# Patient Record
Sex: MALE | Race: WHITE | NOT HISPANIC OR LATINO | Employment: UNEMPLOYED | URBAN - NONMETROPOLITAN AREA
[De-identification: names, ages, dates, MRNs, and addresses within clinical notes are randomized per-mention and may not be internally consistent; named-entity substitution may affect disease eponyms.]

---

## 2022-06-13 ENCOUNTER — HISTORICAL (OUTPATIENT)
Dept: ADMINISTRATIVE | Facility: HOSPITAL | Age: 42
End: 2022-06-13

## 2022-07-19 ENCOUNTER — HISTORICAL (OUTPATIENT)
Dept: ADMINISTRATIVE | Facility: HOSPITAL | Age: 42
End: 2022-07-19

## 2022-07-29 ENCOUNTER — HISTORICAL (OUTPATIENT)
Dept: ADMINISTRATIVE | Facility: HOSPITAL | Age: 42
End: 2022-07-29

## 2022-08-09 ENCOUNTER — HISTORICAL (OUTPATIENT)
Dept: ADMINISTRATIVE | Facility: HOSPITAL | Age: 42
End: 2022-08-09

## 2023-02-07 DIAGNOSIS — M79.672 LEFT FOOT PAIN: Primary | ICD-10-CM

## 2023-02-07 DIAGNOSIS — M79.672 PAIN IN LEFT FOOT: Primary | ICD-10-CM

## 2023-02-16 ENCOUNTER — HOSPITAL ENCOUNTER (OUTPATIENT)
Dept: PREADMISSION TESTING | Facility: HOSPITAL | Age: 43
Discharge: HOME | End: 2023-02-16
Payer: MEDICAID

## 2023-02-16 ENCOUNTER — ANESTHESIA EVENT (OUTPATIENT)
Dept: SURGERY | Facility: HOSPITAL | Age: 43
End: 2023-02-16
Payer: MEDICAID

## 2023-02-16 ENCOUNTER — HOSPITAL ENCOUNTER (OUTPATIENT)
Dept: RADIOLOGY | Facility: HOSPITAL | Age: 43
Discharge: HOME | End: 2023-02-16
Attending: SPECIALIST
Payer: MEDICAID

## 2023-02-16 VITALS — HEIGHT: 76 IN | BODY MASS INDEX: 26.18 KG/M2 | WEIGHT: 215 LBS

## 2023-02-16 DIAGNOSIS — S82.832A CLOSED FRACTURE OF PROXIMAL END OF LEFT FIBULA, UNSPECIFIED FRACTURE MORPHOLOGY, INITIAL ENCOUNTER: ICD-10-CM

## 2023-02-16 DIAGNOSIS — S82.832A CLOSED FRACTURE OF PROXIMAL END OF LEFT FIBULA, UNSPECIFIED FRACTURE MORPHOLOGY, INITIAL ENCOUNTER: Primary | ICD-10-CM

## 2023-02-16 LAB
ANION GAP SERPL CALC-SCNC: 6 MEQ/L (ref 2–13)
APPEARANCE UR: CLEAR
BASOPHILS # BLD AUTO: 0.04 X10(3)/MCL (ref 0.01–0.08)
BASOPHILS NFR BLD AUTO: 0.7 % (ref 0.1–1.2)
BILIRUB UR QL STRIP.AUTO: NEGATIVE MG/DL
BUN SERPL-MCNC: 10 MG/DL (ref 7–20)
CALCIUM SERPL-MCNC: 10 MG/DL (ref 8.4–10.2)
CHLORIDE SERPL-SCNC: 105 MMOL/L (ref 98–110)
CO2 SERPL-SCNC: 30 MMOL/L (ref 21–32)
COLOR UR AUTO: YELLOW
CREAT SERPL-MCNC: 0.9 MG/DL (ref 0.66–1.25)
CREAT/UREA NIT SERPL: 11 (ref 12–20)
EOSINOPHIL # BLD AUTO: 0.14 X10(3)/MCL (ref 0.04–0.54)
EOSINOPHIL NFR BLD AUTO: 2.3 % (ref 0.7–7)
ERYTHROCYTE [DISTWIDTH] IN BLOOD BY AUTOMATED COUNT: 12.6 % (ref 11.6–14.4)
GFR SERPLBLD CREATININE-BSD FMLA CKD-EPI: >90 MLS/MIN/1.73/M2
GLUCOSE SERPL-MCNC: 84 MG/DL (ref 70–115)
GLUCOSE UR QL STRIP.AUTO: NEGATIVE MG/DL
HCT VFR BLD AUTO: 44.2 % (ref 36–52)
HGB BLD-MCNC: 14.5 GM/DL (ref 13–18)
IMM GRANULOCYTES # BLD AUTO: 0.02 X10(3)/MCL (ref 0–0.03)
IMM GRANULOCYTES NFR BLD AUTO: 0.3 % (ref 0–0.5)
KETONES UR QL STRIP.AUTO: NEGATIVE MG/DL
LEUKOCYTE ESTERASE UR QL STRIP.AUTO: NEGATIVE UNIT/L
LYMPHOCYTES # BLD AUTO: 2.06 X10(3)/MCL (ref 1.32–3.57)
LYMPHOCYTES NFR BLD AUTO: 33.5 % (ref 20–55)
MCH RBC QN AUTO: 32.6 PG (ref 27–34)
MCV RBC AUTO: 99.3 FL (ref 79–99)
MEAN CELL HEMOGLOBIN CONCENTRATION (OHS) G/DL: 32.8 G/DL (ref 31–37)
MONOCYTES # BLD AUTO: 0.8 X10(3)/MCL (ref 0.3–0.82)
MONOCYTES NFR BLD AUTO: 13 % (ref 4.7–12.5)
NEUTROPHILS # BLD AUTO: 3.09 X10(3)/MCL (ref 1.78–5.38)
NEUTROPHILS NFR BLD AUTO: 50.2 % (ref 37–73)
NITRITE UR QL STRIP.AUTO: NEGATIVE
NRBC BLD AUTO-RTO: 0 % (ref 0–1)
PH UR STRIP.AUTO: 6 [PH]
PLATELET # BLD AUTO: 308 X10(3)/MCL (ref 140–371)
PMV BLD AUTO: 9.8 FL (ref 9.4–12.4)
POTASSIUM SERPL-SCNC: 4.5 MMOL/L (ref 3.5–5.1)
PROT UR QL STRIP.AUTO: NEGATIVE MG/DL
RBC # BLD AUTO: 4.45 X10(6)/MCL (ref 4–6)
RBC UR QL AUTO: NEGATIVE UNIT/L
SODIUM SERPL-SCNC: 141 MMOL/L (ref 135–145)
SP GR UR STRIP.AUTO: 1.02
UROBILINOGEN UR STRIP-ACNC: 0.2 MG/DL
WBC # SPEC AUTO: 6.2 X10(3)/MCL (ref 4–11.5)

## 2023-02-16 PROCEDURE — 93010 EKG 12-LEAD: ICD-10-PCS | Mod: ,,, | Performed by: INTERNAL MEDICINE

## 2023-02-16 PROCEDURE — 85025 COMPLETE CBC W/AUTO DIFF WBC: CPT | Performed by: SPECIALIST

## 2023-02-16 PROCEDURE — 93010 ELECTROCARDIOGRAM REPORT: CPT | Mod: ,,, | Performed by: INTERNAL MEDICINE

## 2023-02-16 PROCEDURE — 81003 URINALYSIS AUTO W/O SCOPE: CPT | Performed by: SPECIALIST

## 2023-02-16 PROCEDURE — 80048 BASIC METABOLIC PNL TOTAL CA: CPT | Performed by: SPECIALIST

## 2023-02-16 PROCEDURE — 93005 ELECTROCARDIOGRAM TRACING: CPT

## 2023-02-16 PROCEDURE — 71046 X-RAY EXAM CHEST 2 VIEWS: CPT | Mod: TC

## 2023-02-16 RX ORDER — SODIUM CHLORIDE, SODIUM LACTATE, POTASSIUM CHLORIDE, CALCIUM CHLORIDE 600; 310; 30; 20 MG/100ML; MG/100ML; MG/100ML; MG/100ML
INJECTION, SOLUTION INTRAVENOUS CONTINUOUS
Status: CANCELLED | OUTPATIENT
Start: 2023-02-17

## 2023-02-16 RX ORDER — CEFAZOLIN SODIUM 2 G/50ML
2 SOLUTION INTRAVENOUS
Status: CANCELLED | OUTPATIENT
Start: 2023-02-17

## 2023-02-16 RX ORDER — FLUOXETINE HYDROCHLORIDE 20 MG/1
20 CAPSULE ORAL DAILY
COMMUNITY
Start: 2023-02-10

## 2023-02-16 NOTE — DISCHARGE INSTRUCTIONS

## 2023-02-16 NOTE — ANESTHESIA PREPROCEDURE EVALUATION
02/16/2023  Jeffrey Rankin is a 42 y.o., male.      Pre-op Assessment    I have reviewed the Patient Summary Reports.     I have reviewed the Nursing Notes. I have reviewed the NPO Status.   I have reviewed the Medications.     Review of Systems  Anesthesia Hx:  No problems with previous Anesthesia  Denies Family Hx of Anesthesia complications.   Denies Personal Hx of Anesthesia complications.   Hematology/Oncology:  Hematology Normal   Oncology Normal     EENT/Dental:EENT/Dental Normal   Cardiovascular:  Cardiovascular Normal Exercise tolerance: good     Pulmonary:  Pulmonary Normal    Renal/:  Renal/ Normal     Hepatic/GI:  Hepatic/GI Normal    Musculoskeletal:  Musculoskeletal Normal    Neurological:  Neurology Normal    Endocrine:  Endocrine Normal    Dermatological:  Skin Normal    Psych:  Psychiatric Normal           Physical Exam  General: Well nourished, Cooperative, Alert and Oriented    Airway:  Mallampati: II / II  Mouth Opening: Normal  TM Distance: Normal  Tongue: Normal  Neck ROM: Normal ROM    Dental:  Intact        Anesthesia Plan  Type of Anesthesia, risks & benefits discussed:    Anesthesia Type: Gen ETT  Intra-op Monitoring Plan: Standard ASA Monitors  Post Op Pain Control Plan: multimodal analgesia  Induction:  IV  Airway Plan: Direct  Informed Consent: Informed consent signed with the Patient and all parties understand the risks and agree with anesthesia plan.  All questions answered. Patient consented to blood products? Yes  ASA Score: 2  Day of Surgery Review of History & Physical: H&P Update referred to the surgeon/provider.I have interviewed and examined the patient. I have reviewed the patient's H&P dated: There are no significant changes.     Ready For Surgery From Anesthesia Perspective.     .

## 2023-02-17 ENCOUNTER — ANESTHESIA (OUTPATIENT)
Dept: SURGERY | Facility: HOSPITAL | Age: 43
End: 2023-02-17
Payer: MEDICAID

## 2023-02-17 ENCOUNTER — HOSPITAL ENCOUNTER (OUTPATIENT)
Facility: HOSPITAL | Age: 43
Discharge: HOME | End: 2023-02-17
Attending: SPECIALIST | Admitting: SPECIALIST
Payer: MEDICAID

## 2023-02-17 VITALS
BODY MASS INDEX: 26.18 KG/M2 | TEMPERATURE: 98 F | OXYGEN SATURATION: 100 % | SYSTOLIC BLOOD PRESSURE: 122 MMHG | HEIGHT: 76 IN | RESPIRATION RATE: 18 BRPM | DIASTOLIC BLOOD PRESSURE: 92 MMHG | WEIGHT: 215 LBS | HEART RATE: 55 BPM

## 2023-02-17 DIAGNOSIS — S82.832A CLOSED FRACTURE OF PROXIMAL END OF LEFT FIBULA, UNSPECIFIED FRACTURE MORPHOLOGY, INITIAL ENCOUNTER: ICD-10-CM

## 2023-02-17 PROBLEM — S82.62XA CLOSED FRACTURE OF LEFT LATERAL MALLEOLUS: Status: ACTIVE | Noted: 2023-02-17

## 2023-02-17 PROCEDURE — D9220A PRA ANESTHESIA: ICD-10-PCS | Mod: ,,, | Performed by: NURSE ANESTHETIST, CERTIFIED REGISTERED

## 2023-02-17 PROCEDURE — D9220A PRA ANESTHESIA: Mod: ,,, | Performed by: NURSE ANESTHETIST, CERTIFIED REGISTERED

## 2023-02-17 PROCEDURE — 37000009 HC ANESTHESIA EA ADD 15 MINS: Performed by: SPECIALIST

## 2023-02-17 PROCEDURE — 71000015 HC POSTOP RECOV 1ST HR: Performed by: SPECIALIST

## 2023-02-17 PROCEDURE — 63600175 PHARM REV CODE 636 W HCPCS: Performed by: NURSE ANESTHETIST, CERTIFIED REGISTERED

## 2023-02-17 PROCEDURE — 63600175 PHARM REV CODE 636 W HCPCS: Performed by: SPECIALIST

## 2023-02-17 PROCEDURE — 25000003 PHARM REV CODE 250: Performed by: NURSE ANESTHETIST, CERTIFIED REGISTERED

## 2023-02-17 PROCEDURE — 71000016 HC POSTOP RECOV ADDL HR: Performed by: SPECIALIST

## 2023-02-17 PROCEDURE — 36000711: Performed by: SPECIALIST

## 2023-02-17 PROCEDURE — 37000008 HC ANESTHESIA 1ST 15 MINUTES: Performed by: SPECIALIST

## 2023-02-17 PROCEDURE — 27201423 OPTIME MED/SURG SUP & DEVICES STERILE SUPPLY: Performed by: SPECIALIST

## 2023-02-17 PROCEDURE — C1713 ANCHOR/SCREW BN/BN,TIS/BN: HCPCS | Performed by: SPECIALIST

## 2023-02-17 PROCEDURE — 36000710: Performed by: SPECIALIST

## 2023-02-17 PROCEDURE — 71000033 HC RECOVERY, INTIAL HOUR: Performed by: SPECIALIST

## 2023-02-17 PROCEDURE — 01480 ANES OPEN PX LOWER L/A/F NOS: CPT | Performed by: SPECIALIST

## 2023-02-17 PROCEDURE — 25000003 PHARM REV CODE 250: Performed by: SPECIALIST

## 2023-02-17 DEVICE — SCREW CORTEX 3.5MM X 18MM: Type: IMPLANTABLE DEVICE | Site: FIBULA | Status: FUNCTIONAL

## 2023-02-17 DEVICE — PLATE 8-HOLE LOCKING TUBULAR: Type: IMPLANTABLE DEVICE | Site: FIBULA | Status: FUNCTIONAL

## 2023-02-17 DEVICE — SCREW CORTEX 3.5MM X 16MM: Type: IMPLANTABLE DEVICE | Site: FIBULA | Status: FUNCTIONAL

## 2023-02-17 DEVICE — SCREW CANCELLOUS 4MMX 18MM: Type: IMPLANTABLE DEVICE | Site: FIBULA | Status: FUNCTIONAL

## 2023-02-17 RX ORDER — ONDANSETRON 2 MG/ML
4 INJECTION INTRAMUSCULAR; INTRAVENOUS EVERY 12 HOURS PRN
Status: DISCONTINUED | OUTPATIENT
Start: 2023-02-17 | End: 2023-02-17 | Stop reason: HOSPADM

## 2023-02-17 RX ORDER — SODIUM CHLORIDE, SODIUM LACTATE, POTASSIUM CHLORIDE, CALCIUM CHLORIDE 600; 310; 30; 20 MG/100ML; MG/100ML; MG/100ML; MG/100ML
INJECTION, SOLUTION INTRAVENOUS CONTINUOUS
Status: DISCONTINUED | OUTPATIENT
Start: 2023-02-17 | End: 2023-02-17 | Stop reason: HOSPADM

## 2023-02-17 RX ORDER — SODIUM CHLORIDE 0.9 % (FLUSH) 0.9 %
10 SYRINGE (ML) INJECTION
Status: DISCONTINUED | OUTPATIENT
Start: 2023-02-17 | End: 2023-02-17 | Stop reason: HOSPADM

## 2023-02-17 RX ORDER — MORPHINE SULFATE 4 MG/ML
3 INJECTION, SOLUTION INTRAMUSCULAR; INTRAVENOUS
Status: DISCONTINUED | OUTPATIENT
Start: 2023-02-17 | End: 2023-02-17 | Stop reason: HOSPADM

## 2023-02-17 RX ORDER — FAMOTIDINE 10 MG/ML
20 INJECTION INTRAVENOUS ONCE
Status: COMPLETED | OUTPATIENT
Start: 2023-02-17 | End: 2023-02-17

## 2023-02-17 RX ORDER — GLYCOPYRROLATE 0.2 MG/ML
0.2 INJECTION INTRAMUSCULAR; INTRAVENOUS ONCE
Status: COMPLETED | OUTPATIENT
Start: 2023-02-17 | End: 2023-02-17

## 2023-02-17 RX ORDER — MIDAZOLAM HYDROCHLORIDE 1 MG/ML
2 INJECTION INTRAMUSCULAR; INTRAVENOUS ONCE AS NEEDED
Status: COMPLETED | OUTPATIENT
Start: 2023-02-17 | End: 2023-02-17

## 2023-02-17 RX ORDER — FENTANYL CITRATE 50 UG/ML
INJECTION, SOLUTION INTRAMUSCULAR; INTRAVENOUS
Status: DISCONTINUED | OUTPATIENT
Start: 2023-02-17 | End: 2023-02-17

## 2023-02-17 RX ORDER — DEXAMETHASONE SODIUM PHOSPHATE 4 MG/ML
INJECTION, SOLUTION INTRA-ARTICULAR; INTRALESIONAL; INTRAMUSCULAR; INTRAVENOUS; SOFT TISSUE
Status: DISCONTINUED | OUTPATIENT
Start: 2023-02-17 | End: 2023-02-17

## 2023-02-17 RX ORDER — PROPOFOL 10 MG/ML
VIAL (ML) INTRAVENOUS
Status: DISCONTINUED | OUTPATIENT
Start: 2023-02-17 | End: 2023-02-17

## 2023-02-17 RX ORDER — BUPIVACAINE HYDROCHLORIDE 5 MG/ML
INJECTION, SOLUTION EPIDURAL; INTRACAUDAL
Status: DISCONTINUED | OUTPATIENT
Start: 2023-02-17 | End: 2023-02-17 | Stop reason: HOSPADM

## 2023-02-17 RX ORDER — LIDOCAINE HYDROCHLORIDE 10 MG/ML
1 INJECTION, SOLUTION EPIDURAL; INFILTRATION; INTRACAUDAL; PERINEURAL ONCE
Status: DISCONTINUED | OUTPATIENT
Start: 2023-02-17 | End: 2023-02-17 | Stop reason: HOSPADM

## 2023-02-17 RX ORDER — LIDOCAINE HYDROCHLORIDE 20 MG/ML
INJECTION INTRAVENOUS
Status: DISCONTINUED | OUTPATIENT
Start: 2023-02-17 | End: 2023-02-17

## 2023-02-17 RX ORDER — CEFAZOLIN SODIUM 2 G/50ML
2 SOLUTION INTRAVENOUS
Status: COMPLETED | OUTPATIENT
Start: 2023-02-17 | End: 2023-02-17

## 2023-02-17 RX ORDER — KETOROLAC TROMETHAMINE 30 MG/ML
INJECTION, SOLUTION INTRAMUSCULAR; INTRAVENOUS
Status: DISCONTINUED | OUTPATIENT
Start: 2023-02-17 | End: 2023-02-17

## 2023-02-17 RX ORDER — ONDANSETRON 2 MG/ML
INJECTION INTRAMUSCULAR; INTRAVENOUS
Status: DISCONTINUED | OUTPATIENT
Start: 2023-02-17 | End: 2023-02-17

## 2023-02-17 RX ORDER — HYDROCODONE BITARTRATE AND ACETAMINOPHEN 5; 325 MG/1; MG/1
1 TABLET ORAL EVERY 4 HOURS PRN
Status: DISCONTINUED | OUTPATIENT
Start: 2023-02-17 | End: 2023-02-17 | Stop reason: HOSPADM

## 2023-02-17 RX ADMIN — FENTANYL CITRATE 50 MCG: 50 INJECTION, SOLUTION INTRAMUSCULAR; INTRAVENOUS at 12:02

## 2023-02-17 RX ADMIN — LIDOCAINE HYDROCHLORIDE 75 MG: 20 INJECTION, SOLUTION INTRAVENOUS at 11:02

## 2023-02-17 RX ADMIN — PROPOFOL 160 MG: 10 INJECTION, EMULSION INTRAVENOUS at 11:02

## 2023-02-17 RX ADMIN — FENTANYL CITRATE 100 MCG: 50 INJECTION, SOLUTION INTRAMUSCULAR; INTRAVENOUS at 11:02

## 2023-02-17 RX ADMIN — DEXAMETHASONE SODIUM PHOSPHATE 8 MG: 4 INJECTION, SOLUTION INTRA-ARTICULAR; INTRALESIONAL; INTRAMUSCULAR; INTRAVENOUS; SOFT TISSUE at 11:02

## 2023-02-17 RX ADMIN — MORPHINE SULFATE 3 MG: 4 INJECTION, SOLUTION INTRAMUSCULAR; INTRAVENOUS at 01:02

## 2023-02-17 RX ADMIN — GLYCOPYRROLATE 0.2 MG: 0.2 INJECTION INTRAMUSCULAR; INTRAVENOUS at 09:02

## 2023-02-17 RX ADMIN — CEFAZOLIN SODIUM 2 G: 2 SOLUTION INTRAVENOUS at 11:02

## 2023-02-17 RX ADMIN — SODIUM CHLORIDE, POTASSIUM CHLORIDE, SODIUM LACTATE AND CALCIUM CHLORIDE: 600; 310; 30; 20 INJECTION, SOLUTION INTRAVENOUS at 08:02

## 2023-02-17 RX ADMIN — ONDANSETRON 4 MG: 2 INJECTION INTRAMUSCULAR; INTRAVENOUS at 11:02

## 2023-02-17 RX ADMIN — FAMOTIDINE 20 MG: 10 INJECTION INTRAVENOUS at 09:02

## 2023-02-17 RX ADMIN — FENTANYL CITRATE 50 MCG: 50 INJECTION, SOLUTION INTRAMUSCULAR; INTRAVENOUS at 11:02

## 2023-02-17 RX ADMIN — KETOROLAC TROMETHAMINE 30 MG: 30 INJECTION, SOLUTION INTRAMUSCULAR; INTRAVENOUS at 12:02

## 2023-02-17 RX ADMIN — MIDAZOLAM HYDROCHLORIDE 2 MG: 1 INJECTION, SOLUTION INTRAMUSCULAR; INTRAVENOUS at 11:02

## 2023-02-17 NOTE — OP NOTE
Operative note     Date: 2/17/2023     Preop diagnosis:  Left fibular fracture    Postop diagnosis: same    Procedure:  ORIF left distal fibula fracture    Surgeon: Keanu Villafana M.D.    Assistant: S[facility] [place]    Anesthesia:  General    Complications: none    Blood loss: nil    Procedure in detail:  Informed consent was obtained.  Risks of the procedure was explained not excluding infection, bleeding, pain, scarring, neurovascular injury.  Given IV antibiotics general anesthesia.  Prepped and draped sterilely the tourniquet was inflated to 350.  Lateral incision was made over the fibular fracture.  This fracture had some callus evident.  This was removed with a key elevator and reduced the fracture realigned in the clear space bilaterally.  I secured with a 1/3 semi tubular Synthes plate.  Following this I checked the syndesmosis and it was found to be stable.  The wound was irrigated the subQ was closed with 2-0 staples sterile dressing was applied posterior splint.  No complications.    Keanu Villafana M.D.

## 2023-02-27 NOTE — DISCHARGE SUMMARY
[facility]  Discharge Note  Short Stay    Procedure(s) (LRB):  ORIF, FRACTURE, FIBULA (Left)      OUTCOME: Patient tolerated treatment/procedure well without complication and is now ready for discharge.    DISPOSITION: Home or Self Care    FINAL DIAGNOSIS:  Closed fracture of left lateral malleolus    FOLLOWUP: In clinic    DISCHARGE INSTRUCTIONS:  No discharge procedures on file.     TIME SPENT ON DISCHARGE: 20 minutes

## 2025-05-22 ENCOUNTER — HOSPITAL ENCOUNTER (INPATIENT)
Facility: HOSPITAL | Age: 45
LOS: 3 days | Discharge: HOME | DRG: 897 | End: 2025-05-25
Attending: EMERGENCY MEDICINE | Admitting: FAMILY MEDICINE
Payer: MEDICAID

## 2025-05-22 DIAGNOSIS — R11.2 NAUSEA AND VOMITING, UNSPECIFIED VOMITING TYPE: ICD-10-CM

## 2025-05-22 DIAGNOSIS — F10.930 ALCOHOL WITHDRAWAL SYNDROME WITHOUT COMPLICATION: ICD-10-CM

## 2025-05-22 DIAGNOSIS — K92.2 GI BLEED: ICD-10-CM

## 2025-05-22 DIAGNOSIS — F10.929 ALCOHOLIC INTOXICATION WITH COMPLICATION: Primary | ICD-10-CM

## 2025-05-22 PROBLEM — F10.229 ACUTE ALCOHOL INTOXICATION WITH ALCOHOLISM: Status: ACTIVE | Noted: 2025-05-22

## 2025-05-22 LAB
ALBUMIN SERPL-MCNC: 4.7 G/DL (ref 3.4–5)
ALBUMIN/GLOB SERPL: 1.7 RATIO
ALP SERPL-CCNC: 67 UNIT/L (ref 50–144)
ALT SERPL-CCNC: 102 UNIT/L (ref 1–45)
ANION GAP SERPL CALC-SCNC: 17 MEQ/L (ref 2–13)
APTT PPP: 23.8 SECONDS (ref 23–29.4)
AST SERPL-CCNC: 105 UNIT/L (ref 17–59)
BASOPHILS # BLD AUTO: 0.02 X10(3)/MCL (ref 0.01–0.08)
BASOPHILS NFR BLD AUTO: 0.2 % (ref 0.1–1.2)
BILIRUB SERPL-MCNC: 0.9 MG/DL (ref 0–1)
BUN SERPL-MCNC: 9 MG/DL (ref 7–20)
CALCIUM SERPL-MCNC: 8.4 MG/DL (ref 8.4–10.2)
CHLORIDE SERPL-SCNC: 99 MMOL/L (ref 98–110)
CO2 SERPL-SCNC: 27 MMOL/L (ref 21–32)
CREAT SERPL-MCNC: 0.86 MG/DL (ref 0.66–1.25)
CREAT/UREA NIT SERPL: 10 (ref 12–20)
EOSINOPHIL # BLD AUTO: 0 X10(3)/MCL (ref 0.04–0.54)
EOSINOPHIL NFR BLD AUTO: 0 % (ref 0.7–7)
ERYTHROCYTE [DISTWIDTH] IN BLOOD BY AUTOMATED COUNT: 13.9 %
ETHANOL BLD-MCNC: 0.27 G/DL
ETHANOL SERPL-MCNC: 274 MG/DL
GFR SERPLBLD CREATININE-BSD FMLA CKD-EPI: >90 ML/MIN/1.73/M2
GLOBULIN SER-MCNC: 2.8 GM/DL (ref 2–3.9)
GLUCOSE SERPL-MCNC: 128 MG/DL (ref 70–115)
GROUP & RH: NORMAL
HCT VFR BLD AUTO: 44.2 % (ref 36–52)
HGB BLD-MCNC: 15 G/DL (ref 13–18)
IMM GRANULOCYTES # BLD AUTO: 0.03 X10(3)/MCL (ref 0–0.03)
IMM GRANULOCYTES NFR BLD AUTO: 0.3 % (ref 0–0.5)
INDIRECT COOMBS: NORMAL
INR PPP: 1.1
LIPASE SERPL-CCNC: 57 U/L (ref 23–300)
LYMPHOCYTES # BLD AUTO: 1.87 X10(3)/MCL (ref 1.32–3.57)
LYMPHOCYTES NFR BLD AUTO: 17.7 % (ref 20–55)
MCH RBC QN AUTO: 31.3 PG (ref 27–34)
MCHC RBC AUTO-ENTMCNC: 33.9 G/DL (ref 31–37)
MCV RBC AUTO: 92.3 FL (ref 79–99)
MONOCYTES # BLD AUTO: 0.78 X10(3)/MCL (ref 0.3–0.82)
MONOCYTES NFR BLD AUTO: 7.4 % (ref 4.7–12.5)
NEUTROPHILS # BLD AUTO: 7.85 X10(3)/MCL (ref 1.78–5.38)
NEUTROPHILS NFR BLD AUTO: 74.4 % (ref 37–73)
NRBC BLD AUTO-RTO: 0 %
OHS QRS DURATION: 86 MS
OHS QTC CALCULATION: 490 MS
PLATELET # BLD AUTO: 255 X10(3)/MCL (ref 140–371)
PMV BLD AUTO: 9 FL (ref 9.4–12.4)
POTASSIUM SERPL-SCNC: 3.6 MMOL/L (ref 3.5–5.1)
PROT SERPL-MCNC: 7.5 GM/DL (ref 6.3–8.2)
PROTHROMBIN TIME: 11.5 SECONDS (ref 9.3–11.9)
RBC # BLD AUTO: 4.79 X10(6)/MCL (ref 4–6)
SODIUM SERPL-SCNC: 143 MMOL/L (ref 136–145)
SPECIMEN OUTDATE: NORMAL
WBC # BLD AUTO: 10.55 X10(3)/MCL (ref 4–11.5)

## 2025-05-22 PROCEDURE — 96374 THER/PROPH/DIAG INJ IV PUSH: CPT

## 2025-05-22 PROCEDURE — 82077 ASSAY SPEC XCP UR&BREATH IA: CPT | Performed by: EMERGENCY MEDICINE

## 2025-05-22 PROCEDURE — 94761 N-INVAS EAR/PLS OXIMETRY MLT: CPT

## 2025-05-22 PROCEDURE — 25000003 PHARM REV CODE 250: Performed by: EMERGENCY MEDICINE

## 2025-05-22 PROCEDURE — 86850 RBC ANTIBODY SCREEN: CPT | Performed by: EMERGENCY MEDICINE

## 2025-05-22 PROCEDURE — 21400001 HC TELEMETRY ROOM

## 2025-05-22 PROCEDURE — 96375 TX/PRO/DX INJ NEW DRUG ADDON: CPT

## 2025-05-22 PROCEDURE — 63600175 PHARM REV CODE 636 W HCPCS: Performed by: FAMILY MEDICINE

## 2025-05-22 PROCEDURE — 93005 ELECTROCARDIOGRAM TRACING: CPT

## 2025-05-22 PROCEDURE — 93010 ELECTROCARDIOGRAM REPORT: CPT | Mod: ,,, | Performed by: INTERNAL MEDICINE

## 2025-05-22 PROCEDURE — 25000003 PHARM REV CODE 250: Performed by: FAMILY MEDICINE

## 2025-05-22 PROCEDURE — 63600175 PHARM REV CODE 636 W HCPCS: Performed by: EMERGENCY MEDICINE

## 2025-05-22 PROCEDURE — 85730 THROMBOPLASTIN TIME PARTIAL: CPT | Performed by: EMERGENCY MEDICINE

## 2025-05-22 PROCEDURE — 63600175 PHARM REV CODE 636 W HCPCS: Performed by: INTERNAL MEDICINE

## 2025-05-22 PROCEDURE — 99285 EMERGENCY DEPT VISIT HI MDM: CPT | Mod: 25

## 2025-05-22 PROCEDURE — 85025 COMPLETE CBC W/AUTO DIFF WBC: CPT | Performed by: EMERGENCY MEDICINE

## 2025-05-22 PROCEDURE — 85610 PROTHROMBIN TIME: CPT | Performed by: EMERGENCY MEDICINE

## 2025-05-22 PROCEDURE — 96361 HYDRATE IV INFUSION ADD-ON: CPT

## 2025-05-22 PROCEDURE — 80053 COMPREHEN METABOLIC PANEL: CPT | Performed by: EMERGENCY MEDICINE

## 2025-05-22 PROCEDURE — 83690 ASSAY OF LIPASE: CPT | Performed by: EMERGENCY MEDICINE

## 2025-05-22 PROCEDURE — 11000001 HC ACUTE MED/SURG PRIVATE ROOM

## 2025-05-22 RX ORDER — FOLIC ACID 1 MG/1
1 TABLET ORAL DAILY
Status: DISCONTINUED | OUTPATIENT
Start: 2025-05-22 | End: 2025-05-25 | Stop reason: HOSPADM

## 2025-05-22 RX ORDER — LORAZEPAM 2 MG/ML
1 INJECTION INTRAMUSCULAR
Status: COMPLETED | OUTPATIENT
Start: 2025-05-22 | End: 2025-05-22

## 2025-05-22 RX ORDER — THIAMINE HCL 100 MG
100 TABLET ORAL DAILY
Status: DISCONTINUED | OUTPATIENT
Start: 2025-05-22 | End: 2025-05-25 | Stop reason: HOSPADM

## 2025-05-22 RX ORDER — IBUPROFEN 600 MG/1
600 TABLET, FILM COATED ORAL EVERY 6 HOURS PRN
Status: DISCONTINUED | OUTPATIENT
Start: 2025-05-22 | End: 2025-05-25 | Stop reason: HOSPADM

## 2025-05-22 RX ORDER — LORAZEPAM 2 MG/ML
0.5 INJECTION INTRAMUSCULAR
Status: COMPLETED | OUTPATIENT
Start: 2025-05-22 | End: 2025-05-22

## 2025-05-22 RX ORDER — PROCHLORPERAZINE EDISYLATE 5 MG/ML
5 INJECTION INTRAMUSCULAR; INTRAVENOUS ONCE
Status: COMPLETED | OUTPATIENT
Start: 2025-05-22 | End: 2025-05-22

## 2025-05-22 RX ORDER — LEVETIRACETAM 750 MG/1
750 TABLET ORAL DAILY
COMMUNITY
Start: 2025-04-05

## 2025-05-22 RX ORDER — SODIUM CHLORIDE 9 MG/ML
INJECTION, SOLUTION INTRAVENOUS CONTINUOUS
Status: DISCONTINUED | OUTPATIENT
Start: 2025-05-22 | End: 2025-05-25 | Stop reason: HOSPADM

## 2025-05-22 RX ORDER — LORAZEPAM 2 MG/ML
2 INJECTION INTRAMUSCULAR
Status: DISCONTINUED | OUTPATIENT
Start: 2025-05-22 | End: 2025-05-25 | Stop reason: HOSPADM

## 2025-05-22 RX ORDER — ONDANSETRON HYDROCHLORIDE 2 MG/ML
4 INJECTION, SOLUTION INTRAVENOUS
Status: COMPLETED | OUTPATIENT
Start: 2025-05-22 | End: 2025-05-22

## 2025-05-22 RX ORDER — CLONIDINE HYDROCHLORIDE 0.1 MG/1
0.2 TABLET ORAL EVERY 6 HOURS PRN
Status: DISCONTINUED | OUTPATIENT
Start: 2025-05-22 | End: 2025-05-25 | Stop reason: HOSPADM

## 2025-05-22 RX ORDER — FAMOTIDINE 20 MG/1
20 TABLET, FILM COATED ORAL DAILY
Status: DISCONTINUED | OUTPATIENT
Start: 2025-05-22 | End: 2025-05-22

## 2025-05-22 RX ORDER — FAMOTIDINE 20 MG/1
20 TABLET, FILM COATED ORAL 2 TIMES DAILY
Status: DISCONTINUED | OUTPATIENT
Start: 2025-05-22 | End: 2025-05-25 | Stop reason: HOSPADM

## 2025-05-22 RX ORDER — TALC
6 POWDER (GRAM) TOPICAL NIGHTLY PRN
Status: DISCONTINUED | OUTPATIENT
Start: 2025-05-22 | End: 2025-05-25 | Stop reason: HOSPADM

## 2025-05-22 RX ORDER — PANTOPRAZOLE SODIUM 40 MG/10ML
40 INJECTION, POWDER, LYOPHILIZED, FOR SOLUTION INTRAVENOUS
Status: COMPLETED | OUTPATIENT
Start: 2025-05-22 | End: 2025-05-22

## 2025-05-22 RX ORDER — LORAZEPAM 1 MG/1
2 TABLET ORAL EVERY 4 HOURS PRN
Status: DISCONTINUED | OUTPATIENT
Start: 2025-05-22 | End: 2025-05-25 | Stop reason: HOSPADM

## 2025-05-22 RX ORDER — SERTRALINE HYDROCHLORIDE 50 MG/1
50 TABLET, FILM COATED ORAL DAILY
Status: DISCONTINUED | OUTPATIENT
Start: 2025-05-22 | End: 2025-05-25 | Stop reason: HOSPADM

## 2025-05-22 RX ORDER — LEVETIRACETAM 500 MG/5ML
1000 INJECTION, SOLUTION, CONCENTRATE INTRAVENOUS
Status: COMPLETED | OUTPATIENT
Start: 2025-05-22 | End: 2025-05-22

## 2025-05-22 RX ORDER — SERTRALINE HYDROCHLORIDE 50 MG/1
50 TABLET, FILM COATED ORAL DAILY
COMMUNITY
Start: 2025-05-06

## 2025-05-22 RX ORDER — HYDRALAZINE HYDROCHLORIDE 20 MG/ML
10 INJECTION INTRAMUSCULAR; INTRAVENOUS EVERY 4 HOURS PRN
Status: DISCONTINUED | OUTPATIENT
Start: 2025-05-22 | End: 2025-05-25 | Stop reason: HOSPADM

## 2025-05-22 RX ORDER — ONDANSETRON HYDROCHLORIDE 2 MG/ML
4 INJECTION, SOLUTION INTRAVENOUS EVERY 6 HOURS PRN
Status: DISCONTINUED | OUTPATIENT
Start: 2025-05-22 | End: 2025-05-25 | Stop reason: HOSPADM

## 2025-05-22 RX ORDER — LORAZEPAM 1 MG/1
2 TABLET ORAL EVERY 6 HOURS
Status: DISCONTINUED | OUTPATIENT
Start: 2025-05-22 | End: 2025-05-23

## 2025-05-22 RX ORDER — SODIUM CHLORIDE 0.9 % (FLUSH) 0.9 %
10 SYRINGE (ML) INJECTION
Status: DISCONTINUED | OUTPATIENT
Start: 2025-05-22 | End: 2025-05-25 | Stop reason: HOSPADM

## 2025-05-22 RX ADMIN — THERA TABS 1 TABLET: TAB at 11:05

## 2025-05-22 RX ADMIN — THIAMINE HCL TAB 100 MG 100 MG: 100 TAB at 11:05

## 2025-05-22 RX ADMIN — SODIUM CHLORIDE 1000 ML: 9 INJECTION, SOLUTION INTRAVENOUS at 09:05

## 2025-05-22 RX ADMIN — LORAZEPAM 0.5 MG: 2 INJECTION INTRAMUSCULAR; INTRAVENOUS at 09:05

## 2025-05-22 RX ADMIN — SODIUM CHLORIDE 1000 ML: 9 INJECTION, SOLUTION INTRAVENOUS at 11:05

## 2025-05-22 RX ADMIN — LEVETIRACETAM 1000 MG: 100 INJECTION, SOLUTION INTRAVENOUS at 11:05

## 2025-05-22 RX ADMIN — LORAZEPAM 2 MG: 1 TABLET ORAL at 02:05

## 2025-05-22 RX ADMIN — FOLIC ACID 1 MG: 1 TABLET ORAL at 11:05

## 2025-05-22 RX ADMIN — ASCORBIC ACID, VITAMIN A PALMITATE, CHOLECALCIFEROL, THIAMINE HYDROCHLORIDE, RIBOFLAVIN-5 PHOSPHATE SODIUM, PYRIDOXINE HYDROCHLORIDE, NIACINAMIDE, DEXPANTHENOL, ALPHA-TOCOPHEROL ACETATE, VITAMIN K1, FOLIC ACID, BIOTIN, CYANOCOBALAMIN: 200; 3300; 200; 6; 3.6; 6; 40; 15; 10; 150; 600; 60; 5 INJECTION, SOLUTION INTRAVENOUS at 01:05

## 2025-05-22 RX ADMIN — ONDANSETRON 4 MG: 2 INJECTION INTRAMUSCULAR; INTRAVENOUS at 09:05

## 2025-05-22 RX ADMIN — LORAZEPAM 1 MG: 2 INJECTION INTRAMUSCULAR; INTRAVENOUS at 11:05

## 2025-05-22 RX ADMIN — SERTRALINE HYDROCHLORIDE 50 MG: 50 TABLET ORAL at 02:05

## 2025-05-22 RX ADMIN — ONDANSETRON 4 MG: 2 INJECTION INTRAMUSCULAR; INTRAVENOUS at 05:05

## 2025-05-22 RX ADMIN — LORAZEPAM 2 MG: 2 INJECTION INTRAMUSCULAR; INTRAVENOUS at 05:05

## 2025-05-22 RX ADMIN — LORAZEPAM 2 MG: 1 TABLET ORAL at 11:05

## 2025-05-22 RX ADMIN — PANTOPRAZOLE SODIUM 40 MG: 40 INJECTION, POWDER, LYOPHILIZED, FOR SOLUTION INTRAVENOUS at 11:05

## 2025-05-22 RX ADMIN — PROCHLORPERAZINE EDISYLATE 5 MG: 5 INJECTION INTRAMUSCULAR; INTRAVENOUS at 09:05

## 2025-05-22 RX ADMIN — FAMOTIDINE 20 MG: 20 TABLET, FILM COATED ORAL at 08:05

## 2025-05-22 RX ADMIN — SODIUM CHLORIDE: 9 INJECTION, SOLUTION INTRAVENOUS at 06:05

## 2025-05-22 RX ADMIN — Medication 6 MG: at 08:05

## 2025-05-22 NOTE — H&P
[facility]  History & Physical    Patient Name: Jeffrey Rankin  MRN: [id number]  Patient Class: IP- Inpatient  Admission Date: 5/22/2025  Attending Physician: Ira Abreu MD  Primary Care Provider: Emre Lassiter MD         Patient information was obtained from patient and ER records.     Subjective:     Principal Problem:<principal problem not specified>    Chief Complaint:   Chief Complaint   Patient presents with    Nausea    Vomiting     Pt presents to ED per EMS with c/o N/V x2 days. EMS states coffee ground emesis. Pt reports he has been on a drinking binge x4 days and drank approx 4 1/2 pints of vodka yesterday. Last drink was last PM.        HPI: 45 yo brought in with ETOH withdrawal, h/o binge drinker last ETOH yesterday evening presented to ER with ETOH level 274, h/o seizures with prior withdrawal, c/o nausea and vomiting, was on keppra since last seizure may have missed some doses.  Pt does not know if he had a seizure     Past Medical History:   Diagnosis Date    Depression     Seizure        Past Surgical History:   Procedure Laterality Date    ORIF FIBULA FRACTURE Left 2/17/2023    Procedure: ORIF, FRACTURE, FIBULA;  Surgeon: Keanu Villafana MD;  Location: [facility];  Service: Orthopedics;  Laterality: Left;    TONSILLECTOMY AND ADENOIDECTOMY Bilateral        Review of patient's allergies indicates:  No Known Allergies    No current facility-administered medications on file prior to encounter.     Current Outpatient Medications on File Prior to Encounter   Medication Sig    levETIRAcetam (KEPPRA) 750 MG Tab Take 750 mg by mouth once daily.    sertraline (ZOLOFT) 50 MG tablet Take 50 mg by mouth once daily.    [DISCONTINUED] FLUoxetine 20 MG capsule Take 20 mg by mouth once daily.     Family History       Problem Relation (Age of Onset)    Cancer Father    Lupus Mother          Tobacco Use    Smoking status: Never     Passive exposure: Current    Smokeless  tobacco: Never   Vaping Use    Vaping status: Never Used   Substance and Sexual Activity    Alcohol use: Yes     Comment: SOCIAL    Drug use: Never    Sexual activity: Yes     Review of Systems   Constitutional:  Negative for appetite change, fatigue and fever.   Respiratory:  Negative for cough, shortness of breath and wheezing.    Cardiovascular:  Negative for chest pain and leg swelling.   Gastrointestinal:  Positive for nausea and vomiting. Negative for abdominal distention, abdominal pain, constipation and diarrhea.   Skin:  Negative for color change, pallor, rash and wound.   Neurological:  Negative for tremors, syncope and headaches.   Psychiatric/Behavioral:  Negative for agitation and behavioral problems.      Objective:     Vital Signs (Most Recent):  Temp: 98.5 °F (36.9 °C) (05/22/25 1234)  Pulse: 107 (05/22/25 1234)  Resp: 20 (05/22/25 1234)  BP: 123/83 (05/22/25 1234)  SpO2: 98 % (05/22/25 1234) Vital Signs (24h Range):  Temp:  [98.1 °F (36.7 °C)-98.6 °F (37 °C)] 98.5 °F (36.9 °C)  Pulse:  [103-132] 107  Resp:  [16-22] 20  SpO2:  [93 %-98 %] 98 %  BP: (123-148)/() 123/83     Weight: 99.2 kg (218 lb 12.8 oz)  Body mass index is 26.63 kg/m².     Physical Exam  Vitals and nursing note reviewed. Exam conducted with a chaperone present.   Constitutional:       General: He is not in acute distress.     Appearance: Normal appearance. He is normal weight. He is not ill-appearing.   HENT:      Head: Normocephalic and atraumatic.      Nose: Nose normal.   Eyes:      General: No scleral icterus.     Conjunctiva/sclera: Conjunctivae normal.   Cardiovascular:      Rate and Rhythm: Normal rate and regular rhythm.      Pulses: Normal pulses.      Heart sounds: Normal heart sounds.   Pulmonary:      Effort: Pulmonary effort is normal.      Breath sounds: Normal breath sounds.   Abdominal:      General: Abdomen is flat. Bowel sounds are normal.      Palpations: Abdomen is soft.   Skin:     General: Skin is warm  and dry.      Findings: No erythema or rash.   Neurological:      General: No focal deficit present.      Mental Status: He is alert and oriented to person, place, and time.   Psychiatric:         Mood and Affect: Mood normal.         Behavior: Behavior normal.         Thought Content: Thought content normal.                Significant Labs: All pertinent labs within the past 24 hours have been reviewed.  CBC:   Recent Labs   Lab 05/22/25  0904   WBC 10.55   HGB 15.0   HCT 44.2        CMP:   Recent Labs   Lab 05/22/25  0908      K 3.6   CL 99   CO2 27   *   BUN 9   CREATININE 0.86   CALCIUM 8.4   PROT 7.5   ALBUMIN 4.7   BILITOT 0.9   ALKPHOS 67   *   *       Significant Imaging: I have reviewed all pertinent imaging results/findings within the past 24 hours.  Assessment/Plan:     Assessment & Plan  Acute alcohol intoxication with alcoholism  Admit for ivf, detox, case management will discuss possible inpt vs. Outp programs pt states he is conisdering his options  Keppra loaded in ER, continue ativan prophylaxis  Thiamine and MVI  Received 2 x 1 L bolus in ER  Current getting bannana bag    VTE Risk Mitigation (From admission, onward)           Ordered     IP VTE LOW RISK PATIENT  Once         05/22/25 1249     Place sequential compression device  Until discontinued         05/22/25 1249     Place BRODIE hose  Until discontinued         05/22/25 1249                     Patient Screened for food insecurity, housing instability, transportation needs, utility difficulties, and interpersonal safety.  No needs identified      Home medications were received by me and reconciled based on the consideration of mental status, ability to tolerate oral medications and side effects. I will reassess and resume additional home medications as clinically indicated.   Pt will be admitted to inpatient status, anticipate will need 2 midnight stay to resolve her medical issues and have appropriate  treatments.             Ira Abreu MD  Department of Hospital Medicine  [facility]

## 2025-05-22 NOTE — NURSING
SENT DR MADRIGAL MESSAGE PT. SHAKING C/O NAUSEA PRN MED GIVEN AT 1723, ATIVAN [id number] TEMP 98.9, BELCHING CONSTANTLY.

## 2025-05-22 NOTE — PLAN OF CARE
05/22/25 1316   Discharge Assessment   Assessment Type Discharge Planning Assessment   Confirmed/corrected address, phone number and insurance Yes   Confirmed Demographics Correct on Facesheet   Source of Information patient   Communicated VAMSI with patient/caregiver Yes   People in Home parent(s)  (Mother)   Name(s) of People in Home Dahlia BoydMother   Facility Arrived From: Home   Do you expect to return to your current living situation? Yes   Prior to hospitilization cognitive status: Alert/Oriented   Current cognitive status: Alert/Oriented   Walking or Climbing Stairs Difficulty no   Dressing/Bathing Difficulty no   Equipment Currently Used at Home none   Readmission within 30 days? No   Patient currently being followed by outpatient case management? No   Do you currently have service(s) that help you manage your care at home? No   Do you take prescription medications? Yes   Do you have prescription coverage? Yes   Do you have any problems affording any of your prescribed medications? No   Is the patient taking medications as prescribed? yes   Who is going to help you get home at discharge? Mother   How do you get to doctors appointments? car, drives self;family or friend will provide   Are you on dialysis? No   Do you take coumadin? No   Discharge Plan A Home   Discharge Plan B Other  (Substance Abuse Rehab)   DME Needed Upon Discharge  none   Discharge Plan discussed with: Patient   Transition of Care Barriers Does not adhere to care plan;Substance Abuse   Physical Activity   On average, how many days per week do you engage in moderate to strenuous exercise (like a brisk walk)? 0 days   On average, how many minutes do you engage in exercise at this level? 0 min   Alcohol Use   Q1: How often do you have a drink containing alcohol? 2-3 per wk   Q2: How many drinks containing alcohol do you have on a typical day when you are drinking? 10 or more   Q3: How often do you have six or more drinks on one  occasion? Daily     Patient agreeable to meeting with Substance Abuse Navigator for Alcoholism and treatment options.

## 2025-05-22 NOTE — SUBJECTIVE & OBJECTIVE
Past Medical History:   Diagnosis Date    Depression     Seizure        Past Surgical History:   Procedure Laterality Date    ORIF FIBULA FRACTURE Left 2/17/2023    Procedure: ORIF, FRACTURE, FIBULA;  Surgeon: Keanu Villafana MD;  Location: [facility];  Service: Orthopedics;  Laterality: Left;    TONSILLECTOMY AND ADENOIDECTOMY Bilateral        Review of patient's allergies indicates:  No Known Allergies    No current facility-administered medications on file prior to encounter.     Current Outpatient Medications on File Prior to Encounter   Medication Sig    levETIRAcetam (KEPPRA) 750 MG Tab Take 750 mg by mouth once daily.    sertraline (ZOLOFT) 50 MG tablet Take 50 mg by mouth once daily.    [DISCONTINUED] FLUoxetine 20 MG capsule Take 20 mg by mouth once daily.     Family History       Problem Relation (Age of Onset)    Cancer Father    Lupus Mother          Tobacco Use    Smoking status: Never     Passive exposure: Current    Smokeless tobacco: Never   Vaping Use    Vaping status: Never Used   Substance and Sexual Activity    Alcohol use: Yes     Comment: SOCIAL    Drug use: Never    Sexual activity: Yes     Review of Systems   Constitutional:  Negative for appetite change, fatigue and fever.   Respiratory:  Negative for cough, shortness of breath and wheezing.    Cardiovascular:  Negative for chest pain and leg swelling.   Gastrointestinal:  Positive for nausea and vomiting. Negative for abdominal distention, abdominal pain, constipation and diarrhea.   Skin:  Negative for color change, pallor, rash and wound.   Neurological:  Negative for tremors, syncope and headaches.   Psychiatric/Behavioral:  Negative for agitation and behavioral problems.      Objective:     Vital Signs (Most Recent):  Temp: 98.5 °F (36.9 °C) (05/22/25 1234)  Pulse: 107 (05/22/25 1234)  Resp: 20 (05/22/25 1234)  BP: 123/83 (05/22/25 1234)  SpO2: 98 % (05/22/25 1234) Vital Signs (24h Range):  Temp:  [98.1 °F (36.7 °C)-98.6 °F (37 °C)]  98.5 °F (36.9 °C)  Pulse:  [103-132] 107  Resp:  [16-22] 20  SpO2:  [93 %-98 %] 98 %  BP: (123-148)/() 123/83     Weight: 99.2 kg (218 lb 12.8 oz)  Body mass index is 26.63 kg/m².     Physical Exam  Vitals and nursing note reviewed. Exam conducted with a chaperone present.   Constitutional:       General: He is not in acute distress.     Appearance: Normal appearance. He is normal weight. He is not ill-appearing.   HENT:      Head: Normocephalic and atraumatic.      Nose: Nose normal.   Eyes:      General: No scleral icterus.     Conjunctiva/sclera: Conjunctivae normal.   Cardiovascular:      Rate and Rhythm: Normal rate and regular rhythm.      Pulses: Normal pulses.      Heart sounds: Normal heart sounds.   Pulmonary:      Effort: Pulmonary effort is normal.      Breath sounds: Normal breath sounds.   Abdominal:      General: Abdomen is flat. Bowel sounds are normal.      Palpations: Abdomen is soft.   Skin:     General: Skin is warm and dry.      Findings: No erythema or rash.   Neurological:      General: No focal deficit present.      Mental Status: He is alert and oriented to person, place, and time.   Psychiatric:         Mood and Affect: Mood normal.         Behavior: Behavior normal.         Thought Content: Thought content normal.                Significant Labs: All pertinent labs within the past 24 hours have been reviewed.  CBC:   Recent Labs   Lab 05/22/25  0904   WBC 10.55   HGB 15.0   HCT 44.2        CMP:   Recent Labs   Lab 05/22/25  0908      K 3.6   CL 99   CO2 27   *   BUN 9   CREATININE 0.86   CALCIUM 8.4   PROT 7.5   ALBUMIN 4.7   BILITOT 0.9   ALKPHOS 67   *   *       Significant Imaging: I have reviewed all pertinent imaging results/findings within the past 24 hours.

## 2025-05-22 NOTE — PLAN OF CARE
Problem: Adult Inpatient Plan of Care  Goal: Plan of Care Review  5/22/2025 1602 by Tracy Rosales LPN  Outcome: Progressing  5/22/2025 1602 by Tracy Rosales LPN  Outcome: Progressing  Goal: Patient-Specific Goal (Individualized)  5/22/2025 1602 by Tracy Rosales LPN  Outcome: Progressing  5/22/2025 1602 by Tracy Rosales LPN  Outcome: Progressing  Goal: Absence of Hospital-Acquired Illness or Injury  5/22/2025 1602 by Tracy Rosales LPN  Outcome: Progressing  5/22/2025 1602 by Tracy Rosales LPN  Outcome: Progressing  Goal: Optimal Comfort and Wellbeing  5/22/2025 1602 by Tracy Rosales LPN  Outcome: Progressing  5/22/2025 1602 by Tracy Rosales LPN  Outcome: Progressing  Goal: Readiness for Transition of Care  5/22/2025 1602 by Tracy Rosales LPN  Outcome: Progressing  5/22/2025 1602 by Tracy Rosales LPN  Outcome: Progressing

## 2025-05-22 NOTE — HPI
45 yo brought in with ETOH withdrawal, h/o binge drinker last ETOH yesterday evening presented to ER with ETOH level 274, h/o seizures with prior withdrawal, c/o nausea and vomiting, was on keppra since last seizure may have missed some doses.  Pt does not know if he had a seizure

## 2025-05-22 NOTE — ED PROVIDER NOTES
Chief Compliant    Chief Complaint   Patient presents with    Nausea    Vomiting     Pt presents to ED per EMS with c/o N/V x2 days. EMS states coffee ground emesis. Pt reports he has been on a drinking binge x4 days and drank approx 4 1/2 pints of vodka yesterday. Last drink was last PM.           History of Present Illness    This is a 44-year-old  man past medical history significant for anxiety/depression and alcohol binge drinking, alcohol withdrawal seizures on Keppra.  He presents to the emergency department by EMS after binge drinking for the last 4 or 5 days and having nausea and vomiting of brown material for the past 2 days.  No large volume hematemesis.  His last drink was 9:00 p.m. last night.  Has been unable to tolerate any p.o. since that time.  No diarrhea.  No history of variceal bleed.  Family thought he might have had a seizure but patient denies this.  They established an IV and started IV fluids EN route.  They gave 4 mg IV Zofran.    The patient was more comfortable on arrival but still feeling nauseated.  His mood seemed low.  He does report that he feels depressed overall but denied any SI or HI or hallucinations.  I offered for him to speak with a mental health provider but he declined at this time.  He reports he sees them intermittently in the outpatient setting.     The patient denies any drug use.  Other than alcohol.               Review of Systems    All 10 systems reviewed and negative except as marked.    GEN: No fever/chills  HEENT: No sore throat or ear aches.  CV: No chest pain or palpitations  RESP: No cough or shortness of breath  GI:  See HPI  : No dysuria or urinary frequency.  NEURO: No focal weakness, numbness/tingling or dizziness, speech or visual changes.  SKIN: No redness, swelling, burises or rash.  MSK: No joint pain or swelling, no injrueis or deformities.  ENDO: No polyuria, polydypsia.  PSYCH:  See HPI      Physical Exam  Physical Exam   /79 (BP  "Location: Left arm, Patient Position: Lying)   Pulse (!) 111   Temp 98.8 °F (37.1 °C) (Oral)   Resp 20   Ht 6' 4" (1.93 m)   Wt 218 lb 12.8 oz (99.2 kg)   SpO2 98%   BMI 26.63 kg/m²   VITALS: Reviewed Triage Vitals and Updated Vitals myself  GEN:  Thin young  man seen up in the ER stretcher appears somewhat fatigued and a bit anxious.  Cooperative with exam.  Holding an emesis bag.  HEENT: EOMI, mmm,   CV:  Tachycardia approximately 120 beats per minute., no murmurs, rubs, gallops. No LE pitting edema. Chest wall nontender.  LUNG: CTAB, no wheezes, rhales, or rhonchi. Nonlabored, no accessory muscle use. Speaking in full sentences.  ABD: Soft, nondistented, normal bowel sounds. No TTP, no rebound, no guarding, no pulsatile masses.   NEURO: Alert, no obvious facial asymmetry, moving all extremities.    MSK: FROM o fall extremities, no obivous deformities  SKIN: No obvious rashes/bruises.  PSYCH:  Somewhat depressed mood and seems somewhat anxious and easily startled.  Denies any SI or HI or hallucinations.  Fine tremor from time to time.            Medical and Surgical History    Past Medical History:   Diagnosis Date    Depression     Seizure          Past Surgical History:   Procedure Laterality Date    ORIF FIBULA FRACTURE Left 2/17/2023    Procedure: ORIF, FRACTURE, FIBULA;  Surgeon: Keanu Villafana MD;  Location: [facility];  Service: Orthopedics;  Laterality: Left;    TONSILLECTOMY AND ADENOIDECTOMY Bilateral            Family History    Family History   Problem Relation Name Age of Onset    Lupus Mother      Cancer Father             Social History    Social History     Socioeconomic History    Marital status:    Tobacco Use    Smoking status: Never     Passive exposure: Current    Smokeless tobacco: Never   Vaping Use    Vaping status: Never Used   Substance and Sexual Activity    Alcohol use: Yes     Comment: SOCIAL    Drug use: Never    Sexual activity: Yes     Social Drivers of " Health     Financial Resource Strain: Low Risk  (5/22/2025)    Overall Financial Resource Strain (CARDIA)     Difficulty of Paying Living Expenses: Not hard at all   Food Insecurity: No Food Insecurity (5/22/2025)    Hunger Vital Sign     Worried About Running Out of Food in the Last Year: Never true     Ran Out of Food in the Last Year: Never true   Transportation Needs: No Transportation Needs (5/22/2025)    PRAPARE - Transportation     Lack of Transportation (Medical): No     Lack of Transportation (Non-Medical): No   Physical Activity: Inactive (5/22/2025)    Exercise Vital Sign     Days of Exercise per Week: 0 days     Minutes of Exercise per Session: 0 min   Stress: No Stress Concern Present (5/22/2025)    [employer] - Occupational Stress Questionnaire     Feeling of Stress : Only a little   Housing Stability: Low Risk  (5/22/2025)    Housing Stability Vital Sign     Unable to Pay for Housing in the Last Year: No     Number of Times Moved in the Last Year: 0     Homeless in the Last Year: No   Recent Concern: Housing Stability - High Risk (4/3/2025)    Received from [facility]    Housing Stability Vital Sign     Unable to Pay for Housing in the Last Year: No     Number of Times Moved in the Last Year: 3     Homeless in the Last Year: No                 Allergies    Review of patient's allergies indicates:  No Known Allergies      Current Medications    Current Medications[1]                      Radiology, Labs, Miscellaneous Studies    Imaging Results    None                Labs    Labs Reviewed   COMPREHENSIVE METABOLIC PANEL - Abnormal       Result Value    Sodium 143      Potassium 3.6      Chloride 99      CO2 27      Glucose 128 (*)     Blood Urea Nitrogen 9      Creatinine 0.86      Calcium 8.4      Protein Total 7.5      Albumin 4.7      Globulin 2.8      Albumin/Globulin Ratio 1.7      Bilirubin  Total 0.9      ALP 67       (*)      (*)     eGFR >90      Anion Gap 17.0 (*)     BUN/Creatinine Ratio 10 (*)    CBC WITH DIFFERENTIAL - Abnormal    WBC 10.55      RBC 4.79      Hgb 15.0      Hct 44.2      MCV 92.3      MCH 31.3      MCHC 33.9      RDW 13.9      Platelet 255      MPV 9.0 (*)     Neut % 74.4 (*)     Lymph % 17.7 (*)     Mono % 7.4      Eos % 0.0 (*)     Basophil % 0.2      Lymph # 1.87      Neut # 7.85 (*)     Mono # 0.78      Eos # 0.00 (*)     Baso # 0.02      Imm Gran # 0.03      Imm Grans % 0.3      NRBC% 0.0     ALCOHOL,MEDICAL (ETHANOL) - Abnormal    Ethanol Level 274.0 (*)     Alcohol, Legal Level 0.274 (*)    PROTIME-INR - Normal    PT 11.5      INR 1.1      Narrative:     Protimes are used to monitor anticoagulant agents such as warfarin. PT INR values are based on the current patient normal mean and the YOVANY value for the specific instrument reagent used.  **Routine theraputic target values for the INR are 2.0-3.0**   APTT - Normal    PTT 23.8     LIPASE - Normal    Lipase Level 57     CBC W/ AUTO DIFFERENTIAL    Narrative:     The following orders were created for panel order CBC auto differential.                  Procedure                               Abnormality         Status                                     ---------                               -----------         ------                                     CBC with Differential[[id number]]       Abnormal            Final result                                                 Please view results for these tests on the individual orders.   TYPE & SCREEN    Group & Rh O POS      Indirect Darrell GEL NEG      Specimen Outdate 05/25/2025 23:59              Future Appointments    No future appointments.                         Medical Decision Making  Amount and/or Complexity of Data Reviewed  Labs: ordered.    Risk  OTC drugs.  Prescription drug management.  Decision regarding hospitalization.        -I reviewed available  "prior medical records in the EPIC Electronic Medical Record and any paper documents brought by patient, family, EMS staff and any others available to me.     -Pertinent details are summarized in this document    -All laboratory, radiologic, and EKG studies that were performed in the Emergency Department were a necessary part of the evaluation needed to exclude unstable or  emergent medical conditions. Each of the unique tests during this ER encounter were ordered by myself for workup of this patient. I also reviewed all of the results.     -Patient's prescriptions were reviewed and changes documented.              "Medical Decision Making" for this ER Encounter:       Nausea and vomiting secondary to alcohol use.  Differential includes gastritis, ulcer, variceal bleed although no large volume emesis.  Could have pancreatitis, hepatitis, cholecystitis as well.  Could be dehydrated or have electrolyte disturbances.  I am definitely concerned about alcohol withdrawal he seems like he is in the early stages now with some hypertension and tachycardia and some restlessness already.  He has had seizures before in the past.  Last drink was 9:00 p.m. last night.    -IV access   -cardiac monitoring   -EKG  -chest x-ray   -CBC, CMP, magnesium   -lipase   -serum alcohol level   -1 L normal saline IV fluid bolus   -4 mg IV Zofran   -40 mg IV pantoprazole   -0.5 mg IV Ativan   -reassess          ED Course, Updates and Disposition:     ED Course as of 05/22/25 1656   Thu May 22, 2025   1039 CBC: Benign   CMP:  Mild elevation of AST of 105 and ALT of 102 with a normal bilirubin and a LP.  Coags benign   Serum alcohol level 274   Lipase normal at 57     -the patient received Zofran x2 but still has some mild nausea but no vomiting.  -0.5 mg IV Ativan given and the patient feels mildly better but still has some tachycardia when he moves around at all and seems a bit anxious and restless.    -I discussed with the patient and his " mother at bedside.  They are both concerned that if he goes home that he is going to continue to have withdrawal symptoms and may seize again.  They would prefer hospitalization to prevent this.  I think this is very reasonable.    -1 mg IV Ativan ordered now   -[facility] protocol with p.r.n. Ativan ordered   -oral multivitamin, folate, thiamine ordered   -will speak with hospitalist about admission [ZH]      ED Course User Index  [ZH] Molina Ferrari MD                                 In the ED, the patient received the following MEDICATIONS:    Medications   LORazepam tablet 2 mg (has no administration in time range)   thiamine tablet 100 mg (100 mg Oral Given 5/22/25 1151)   folic acid tablet 1 mg (1 mg Oral Given 5/22/25 1151)   0.9% NaCl 1,000 mL with mvi, (ADULT) no.4 with vit K 3,300 unit- 150 mcg/10 mL 10 mL infusion ( Intravenous New Bag 5/22/25 1302)   multivitamin tablet (1 tablet Oral Given 5/22/25 1151)   levETIRAcetam tablet 750 mg (750 mg Oral Not Given 5/22/25 1300)   sertraline tablet 50 mg (50 mg Oral Given 5/22/25 1436)   sodium chloride 0.9% flush 10 mL (has no administration in time range)   melatonin tablet 6 mg (has no administration in time range)   ondansetron injection 4 mg (has no administration in time range)   ibuprofen tablet 600 mg (has no administration in time range)   famotidine tablet 20 mg (has no administration in time range)   LORazepam tablet 2 mg (2 mg Oral Given 5/22/25 1436)   cloNIDine tablet 0.2 mg (has no administration in time range)   hydrALAZINE injection 10 mg (has no administration in time range)   0.9% NaCl infusion (has no administration in time range)   sodium chloride 0.9% bolus 1,000 mL 1,000 mL (0 mLs Intravenous Stopped 5/22/25 0941)   ondansetron injection 4 mg (4 mg Intravenous Given 5/22/25 0900)   LORazepam injection 0.5 mg (0.5 mg Intravenous Given 5/22/25 0945)   LORazepam injection 1 mg (1 mg Intravenous Given 5/22/25 1151)   sodium chloride 0.9% bolus  1,000 mL 1,000 mL (0 mLs Intravenous Stopped 5/22/25 1251)   pantoprazole injection 40 mg (40 mg Intravenous Given 5/22/25 1151)   levETIRAcetam injection 1,000 mg (1,000 mg Intravenous Given 5/22/25 1150)                                                       [1]   Current Facility-Administered Medications:     0.9% NaCl 1,000 mL with mvi, (ADULT) no.4 with vit K 3,300 unit- 150 mcg/10 mL 10 mL infusion, , Intravenous, Daily, Molina Ferrari MD, Last Rate: 150 mL/hr at 05/22/25 1302, New Bag at 05/22/25 1302    0.9% NaCl infusion, , Intravenous, Continuous, Ira Abreu MD    cloNIDine tablet 0.2 mg, 0.2 mg, Oral, Q6H PRN, Ira Abreu MD    famotidine tablet 20 mg, 20 mg, Oral, BID, Ira Abreu MD    folic acid tablet 1 mg, 1 mg, Oral, Daily, Molina Ferrari MD, 1 mg at 05/22/25 1151    hydrALAZINE injection 10 mg, 10 mg, Intravenous, Q4H PRN, Ira Abreu MD    ibuprofen tablet 600 mg, 600 mg, Oral, Q6H PRN, Molina Ferrari MD    levETIRAcetam tablet 750 mg, 750 mg, Oral, Daily, Molnia Ferrari MD    LORazepam tablet 2 mg, 2 mg, Oral, Q4H PRN, Ira Abreu MD    LORazepam tablet 2 mg, 2 mg, Oral, Q6H, Ira Abreu MD, 2 mg at 05/22/25 1436    melatonin tablet 6 mg, 6 mg, Oral, Nightly PRN, Molina Ferrari MD    multivitamin tablet, 1 tablet, Oral, Daily, Molina Ferrari MD, 1 tablet at 05/22/25 1151    ondansetron injection 4 mg, 4 mg, Intravenous, Q6H PRN, Molina Ferrari MD    sertraline tablet 50 mg, 50 mg, Oral, Daily, Molina Ferrari MD, 50 mg at 05/22/25 1436    sodium chloride 0.9% flush 10 mL, 10 mL, Intravenous, PRN, Molina Ferrari MD    thiamine tablet 100 mg, 100 mg, Oral, Daily, Molina Ferrari MD, 100 mg at 05/22/25 1151       Molina Ferrari MD  05/22/25 3856

## 2025-05-22 NOTE — ASSESSMENT & PLAN NOTE
Admit for ivf, detox, case management will discuss possible inpt vs. Outp programs pt states he is conisdering his options  Keppra loaded in ER, continue ativan prophylaxis  Thiamine and MVI  Received 2 x 1 L bolus in ER  Current getting bannana bag

## 2025-05-23 PROBLEM — D64.9 ANEMIA: Status: ACTIVE | Noted: 2025-05-23

## 2025-05-23 PROBLEM — E83.42 HYPOMAGNESEMIA: Status: ACTIVE | Noted: 2025-05-23

## 2025-05-23 PROBLEM — E87.1 HYPONATREMIA: Status: ACTIVE | Noted: 2025-05-23

## 2025-05-23 PROBLEM — D69.6 THROMBOCYTOPENIA: Status: ACTIVE | Noted: 2025-05-23

## 2025-05-23 PROBLEM — E87.6 HYPOKALEMIA: Status: ACTIVE | Noted: 2025-05-23

## 2025-05-23 LAB
ALBUMIN SERPL-MCNC: 3.5 G/DL (ref 3.4–5)
ALBUMIN/GLOB SERPL: 1.7 RATIO
ALP SERPL-CCNC: 42 UNIT/L (ref 50–144)
ALT SERPL-CCNC: 64 UNIT/L (ref 1–45)
ANION GAP SERPL CALC-SCNC: 1 MEQ/L (ref 2–13)
AST SERPL-CCNC: 60 UNIT/L (ref 17–59)
BASOPHILS # BLD AUTO: 0.03 X10(3)/MCL (ref 0.01–0.08)
BASOPHILS NFR BLD AUTO: 0.4 % (ref 0.1–1.2)
BILIRUB SERPL-MCNC: 1.3 MG/DL (ref 0–1)
BUN SERPL-MCNC: 8 MG/DL (ref 7–20)
CALCIUM SERPL-MCNC: 8.4 MG/DL (ref 8.4–10.2)
CHLORIDE SERPL-SCNC: 102 MMOL/L (ref 98–110)
CK SERPL-CCNC: 543 U/L (ref 55–170)
CO2 SERPL-SCNC: 31 MMOL/L (ref 21–32)
CREAT SERPL-MCNC: 0.72 MG/DL (ref 0.66–1.25)
CREAT/UREA NIT SERPL: 11 (ref 12–20)
EOSINOPHIL # BLD AUTO: 0.02 X10(3)/MCL (ref 0.04–0.54)
EOSINOPHIL NFR BLD AUTO: 0.3 % (ref 0.7–7)
ERYTHROCYTE [DISTWIDTH] IN BLOOD BY AUTOMATED COUNT: 14.1 %
GFR SERPLBLD CREATININE-BSD FMLA CKD-EPI: >90 ML/MIN/1.73/M2
GLOBULIN SER-MCNC: 2.1 GM/DL (ref 2–3.9)
GLUCOSE SERPL-MCNC: 99 MG/DL (ref 70–115)
HCT VFR BLD AUTO: 34.4 % (ref 36–52)
HGB BLD-MCNC: 11.6 G/DL (ref 13–18)
IMM GRANULOCYTES # BLD AUTO: 0.03 X10(3)/MCL (ref 0–0.03)
IMM GRANULOCYTES NFR BLD AUTO: 0.4 % (ref 0–0.5)
LYMPHOCYTES # BLD AUTO: 1.32 X10(3)/MCL (ref 1.32–3.57)
LYMPHOCYTES NFR BLD AUTO: 19 % (ref 20–55)
MAGNESIUM SERPL-MCNC: 1.6 MG/DL (ref 1.8–2.4)
MCH RBC QN AUTO: 31.6 PG (ref 27–34)
MCHC RBC AUTO-ENTMCNC: 33.7 G/DL (ref 31–37)
MCV RBC AUTO: 93.7 FL (ref 79–99)
MONOCYTES # BLD AUTO: 0.62 X10(3)/MCL (ref 0.3–0.82)
MONOCYTES NFR BLD AUTO: 8.9 % (ref 4.7–12.5)
NEUTROPHILS # BLD AUTO: 4.91 X10(3)/MCL (ref 1.78–5.38)
NEUTROPHILS NFR BLD AUTO: 71 % (ref 37–73)
NRBC BLD AUTO-RTO: 0 %
PLATELET # BLD AUTO: 147 X10(3)/MCL (ref 140–371)
PMV BLD AUTO: 9.4 FL (ref 9.4–12.4)
POTASSIUM SERPL-SCNC: 3.3 MMOL/L (ref 3.5–5.1)
PROT SERPL-MCNC: 5.6 GM/DL (ref 6.3–8.2)
RBC # BLD AUTO: 3.67 X10(6)/MCL (ref 4–6)
SODIUM SERPL-SCNC: 134 MMOL/L (ref 136–145)
WBC # BLD AUTO: 6.93 X10(3)/MCL (ref 4–11.5)

## 2025-05-23 PROCEDURE — 80053 COMPREHEN METABOLIC PANEL: CPT | Performed by: EMERGENCY MEDICINE

## 2025-05-23 PROCEDURE — 82550 ASSAY OF CK (CPK): CPT | Performed by: FAMILY MEDICINE

## 2025-05-23 PROCEDURE — 36415 COLL VENOUS BLD VENIPUNCTURE: CPT | Performed by: EMERGENCY MEDICINE

## 2025-05-23 PROCEDURE — 25000003 PHARM REV CODE 250: Performed by: EMERGENCY MEDICINE

## 2025-05-23 PROCEDURE — 94761 N-INVAS EAR/PLS OXIMETRY MLT: CPT

## 2025-05-23 PROCEDURE — 85025 COMPLETE CBC W/AUTO DIFF WBC: CPT | Performed by: EMERGENCY MEDICINE

## 2025-05-23 PROCEDURE — 83735 ASSAY OF MAGNESIUM: CPT | Performed by: FAMILY MEDICINE

## 2025-05-23 PROCEDURE — 21400001 HC TELEMETRY ROOM

## 2025-05-23 PROCEDURE — 25000003 PHARM REV CODE 250: Performed by: FAMILY MEDICINE

## 2025-05-23 PROCEDURE — 63600175 PHARM REV CODE 636 W HCPCS: Performed by: FAMILY MEDICINE

## 2025-05-23 RX ORDER — PANTOPRAZOLE SODIUM 40 MG/1
40 TABLET, DELAYED RELEASE ORAL DAILY
Status: DISCONTINUED | OUTPATIENT
Start: 2025-05-23 | End: 2025-05-25 | Stop reason: HOSPADM

## 2025-05-23 RX ORDER — LORAZEPAM 1 MG/1
1 TABLET ORAL EVERY 4 HOURS
Status: DISCONTINUED | OUTPATIENT
Start: 2025-05-23 | End: 2025-05-25 | Stop reason: HOSPADM

## 2025-05-23 RX ORDER — MAGNESIUM SULFATE HEPTAHYDRATE 40 MG/ML
2 INJECTION, SOLUTION INTRAVENOUS ONCE
Status: COMPLETED | OUTPATIENT
Start: 2025-05-23 | End: 2025-05-23

## 2025-05-23 RX ORDER — POTASSIUM CHLORIDE 20 MEQ/1
40 TABLET, EXTENDED RELEASE ORAL DAILY
Status: DISCONTINUED | OUTPATIENT
Start: 2025-05-23 | End: 2025-05-25 | Stop reason: HOSPADM

## 2025-05-23 RX ADMIN — SODIUM CHLORIDE: 9 INJECTION, SOLUTION INTRAVENOUS at 01:05

## 2025-05-23 RX ADMIN — PANTOPRAZOLE SODIUM 40 MG: 40 TABLET, DELAYED RELEASE ORAL at 01:05

## 2025-05-23 RX ADMIN — SERTRALINE HYDROCHLORIDE 50 MG: 50 TABLET ORAL at 08:05

## 2025-05-23 RX ADMIN — SODIUM CHLORIDE: 9 INJECTION, SOLUTION INTRAVENOUS at 11:05

## 2025-05-23 RX ADMIN — FOLIC ACID 1 MG: 1 TABLET ORAL at 08:05

## 2025-05-23 RX ADMIN — LORAZEPAM 1 MG: 1 TABLET ORAL at 08:05

## 2025-05-23 RX ADMIN — FAMOTIDINE 20 MG: 20 TABLET, FILM COATED ORAL at 08:05

## 2025-05-23 RX ADMIN — CLONIDINE HYDROCHLORIDE 0.2 MG: 0.1 TABLET ORAL at 10:05

## 2025-05-23 RX ADMIN — MAGNESIUM SULFATE HEPTAHYDRATE 2 G: 40 INJECTION, SOLUTION INTRAVENOUS at 07:05

## 2025-05-23 RX ADMIN — LEVETIRACETAM 750 MG: 500 TABLET, FILM COATED ORAL at 08:05

## 2025-05-23 RX ADMIN — LORAZEPAM 2 MG: 1 TABLET ORAL at 05:05

## 2025-05-23 RX ADMIN — THERA TABS 1 TABLET: TAB at 08:05

## 2025-05-23 RX ADMIN — ASCORBIC ACID, VITAMIN A PALMITATE, CHOLECALCIFEROL, THIAMINE HYDROCHLORIDE, RIBOFLAVIN-5 PHOSPHATE SODIUM, PYRIDOXINE HYDROCHLORIDE, NIACINAMIDE, DEXPANTHENOL, ALPHA-TOCOPHEROL ACETATE, VITAMIN K1, FOLIC ACID, BIOTIN, CYANOCOBALAMIN: 200; 3300; 200; 6; 3.6; 6; 40; 15; 10; 150; 600; 60; 5 INJECTION, SOLUTION INTRAVENOUS at 09:05

## 2025-05-23 RX ADMIN — POTASSIUM CHLORIDE 40 MEQ: 1500 TABLET, EXTENDED RELEASE ORAL at 01:05

## 2025-05-23 RX ADMIN — LORAZEPAM 2 MG: 1 TABLET ORAL at 11:05

## 2025-05-23 RX ADMIN — THIAMINE HCL TAB 100 MG 100 MG: 100 TAB at 08:05

## 2025-05-23 NOTE — HOSPITAL COURSE
"05/23/2025 pt admitted with ETOH withdrawal, possible seizure, pt does not think he had a seizure but bystanders thought he did.  Pt not very forthcoming with details. Pt mother is here with him and says she cannot "handle him" pt states he would go to rehab then told the nurse he would not go to rehab.  [facility]  Case management consulted to discuss with pt options and the substance abuse navigator will be consulted.    05/24/2025 CK improving, feels very weak today   "

## 2025-05-23 NOTE — PLAN OF CARE
Problem: Adult Inpatient Plan of Care  Goal: Plan of Care Review  Outcome: Progressing  Goal: Patient-Specific Goal (Individualized)  Outcome: Progressing  Goal: Absence of Hospital-Acquired Illness or Injury  Outcome: Progressing  Goal: Optimal Comfort and Wellbeing  Outcome: Progressing  Goal: Readiness for Transition of Care  Outcome: Progressing     Problem: Fall Injury Risk  Goal: Absence of Fall and Fall-Related Injury  Outcome: Progressing     Problem: Excessive Substance Use  Goal: Optimized Energy Level (Excessive Substance Use)  Outcome: Progressing  Goal: Improved Behavioral Control (Excessive Substance Use)  Outcome: Progressing  Goal: Increased Participation and Engagement (Excessive Substance Use)  Outcome: Progressing  Goal: Improved Physiologic Symptoms (Excessive Substance Use)  Outcome: Progressing  Goal: Enhanced Social, Occupational or Functional Skills (Excessive Substance Use)  Outcome: Progressing     Problem: Alcohol Withdrawal  Goal: Alcohol Withdrawal Symptom Control  Outcome: Progressing  Goal: Optimal Neurologic Function  Outcome: Progressing  Goal: Readiness for Change Identified  Outcome: Progressing

## 2025-05-23 NOTE — PLAN OF CARE
Keren ( [phone] )with [facility] contacted to speak with patient re' their outpt program. Keren to see patient today.

## 2025-05-23 NOTE — PLAN OF CARE
Problem: Adult Inpatient Plan of Care  Goal: Plan of Care Review  Outcome: Progressing  Goal: Patient-Specific Goal (Individualized)  Outcome: Progressing  Goal: Absence of Hospital-Acquired Illness or Injury  Outcome: Progressing  Goal: Optimal Comfort and Wellbeing  Outcome: Progressing  Goal: Readiness for Transition of Care  Outcome: Progressing     Problem: Fall Injury Risk  Goal: Absence of Fall and Fall-Related Injury  Outcome: Progressing     Problem: Excessive Substance Use  Goal: Optimized Energy Level (Excessive Substance Use)  Outcome: Progressing  Goal: Improved Behavioral Control (Excessive Substance Use)  Outcome: Progressing  Goal: Increased Participation and Engagement (Excessive Substance Use)  Outcome: Progressing  Goal: Improved Physiologic Symptoms (Excessive Substance Use)  Outcome: Progressing  Goal: Enhanced Social, Occupational or Functional Skills (Excessive Substance Use)  Outcome: Progressing     Problem: Alcohol Withdrawal  Goal: Alcohol Withdrawal Symptom Control  Outcome: Progressing  Goal: Optimal Neurologic Function  Outcome: Progressing  Goal: Readiness for Change Identified  Outcome: Progressing    Uneventful night;less tremors;slept;voiding;still has some nausea with dry heaving at times & vomited x1 tonight so that I had to contact md on call to get additional nausea med;tachycardia improved;still slow to respond to questions asked but appropriate answers & oriented;meds administered as ordered per md

## 2025-05-23 NOTE — ASSESSMENT & PLAN NOTE
Admit for ivf, detox, case management will discuss possible inpt vs. Outp programs pt states he is conisdering his options  Keppra loaded in ER, continue ativan prophylaxis  Thiamine and MVI  Received 2 x 1 L bolus in ER    Improved  On scheudled ativan 2mg every 6 hrs, will decrease to 1mg q 4 hrs, wean as tolerated    Iv ativan prn he had one dose since arrival to floor and has been stable with the po dosing overnight  No seizures

## 2025-05-23 NOTE — PROGRESS NOTES
"[facility] Medicine  Progress Note    Patient Name: Jeffrey Rankin  MRN: [id number]  Patient Class: IP- Inpatient   Admission Date: 5/22/2025  Length of Stay: 1 days  Attending Physician: No att. providers found  Primary Care Provider: Emre Lassiter MD        Subjective     Principal Problem:<principal problem not specified>        HPI:  43 yo brought in with ETOH withdrawal, h/o binge drinker last ETOH yesterday evening presented to ER with ETOH level 274, h/o seizures with prior withdrawal, c/o nausea and vomiting, was on keppra since last seizure may have missed some doses.  Pt does not know if he had a seizure     Overview/Hospital Course:  05/23/2025 pt admitted with ETOH withdrawal, possible seizure, pt does not think he had a seizure but bystanders thought he did.  Pt not very forthcoming with details. Pt mother is here with him and says she cannot "handle him" pt states he would go to rehab then told the nurse he would not go to rehab.  Last inpatietn rehab stay about 6 months ago.  Case management consulted to discuss with pt options and the substance abuse navigator will be consulted.      Past Medical History:   Diagnosis Date    Depression     Seizure        Past Surgical History:   Procedure Laterality Date    ORIF FIBULA FRACTURE Left 2/17/2023    Procedure: ORIF, FRACTURE, FIBULA;  Surgeon: Keanu Villafana MD;  Location: [facility];  Service: Orthopedics;  Laterality: Left;    TONSILLECTOMY AND ADENOIDECTOMY Bilateral        Review of patient's allergies indicates:  No Known Allergies    No current facility-administered medications on file prior to encounter.     Current Outpatient Medications on File Prior to Encounter   Medication Sig    levETIRAcetam (KEPPRA) 750 MG Tab Take 750 mg by mouth once daily.    sertraline (ZOLOFT) 50 MG tablet Take 50 mg by mouth once daily.     Family History       Problem Relation (Age of Onset)    Cancer Father    Lupus Mother      "     Tobacco Use    Smoking status: Never     Passive exposure: Current    Smokeless tobacco: Never   Vaping Use    Vaping status: Never Used   Substance and Sexual Activity    Alcohol use: Yes     Comment: SOCIAL    Drug use: Never    Sexual activity: Yes     Review of Systems   Constitutional:  Negative for appetite change, fatigue and fever.   Respiratory:  Negative for cough, shortness of breath and wheezing.    Cardiovascular:  Negative for chest pain and leg swelling.   Gastrointestinal:  Positive for nausea and vomiting. Negative for abdominal distention, abdominal pain, constipation and diarrhea.   Skin:  Negative for color change, pallor, rash and wound.   Neurological:  Negative for tremors, syncope and headaches.   Psychiatric/Behavioral:  Negative for agitation and behavioral problems.      Objective:     Vital Signs (Most Recent):  Temp: 98.4 °F (36.9 °C) (05/23/25 1018)  Pulse: 104 (05/23/25 1244)  Resp: 18 (05/23/25 1018)  BP: 128/88 (05/23/25 1244)  SpO2: 97 % (05/23/25 1018) Vital Signs (24h Range):  Temp:  [97.9 °F (36.6 °C)-98.8 °F (37.1 °C)] 98.4 °F (36.9 °C)  Pulse:  [] 104  Resp:  [12-20] 18  SpO2:  [95 %-98 %] 97 %  BP: (114-150)/(79-98) 128/88     Weight: 99.2 kg (218 lb 12.8 oz)  Body mass index is 26.63 kg/m².     Physical Exam  Vitals and nursing note reviewed. Exam conducted with a chaperone present.   Constitutional:       General: He is not in acute distress.     Appearance: Normal appearance. He is normal weight. He is not ill-appearing.   HENT:      Head: Normocephalic and atraumatic.      Nose: Nose normal.   Eyes:      General: No scleral icterus.     Conjunctiva/sclera: Conjunctivae normal.   Cardiovascular:      Rate and Rhythm: Normal rate and regular rhythm.      Pulses: Normal pulses.      Heart sounds: Normal heart sounds.   Pulmonary:      Effort: Pulmonary effort is normal.      Breath sounds: Normal breath sounds.   Abdominal:      General: Abdomen is flat. Bowel  sounds are normal.      Palpations: Abdomen is soft.   Skin:     General: Skin is warm and dry.      Findings: No erythema or rash.   Neurological:      General: No focal deficit present.      Mental Status: He is alert and oriented to person, place, and time.   Psychiatric:         Mood and Affect: Mood normal.         Behavior: Behavior normal.         Thought Content: Thought content normal.                Significant Labs: All pertinent labs within the past 24 hours have been reviewed.  CBC:   Recent Labs   Lab 05/22/25  0904 05/23/25 0458   WBC 10.55 6.93   HGB 15.0 11.6*   HCT 44.2 34.4*    147     CMP:   Recent Labs   Lab 05/22/25  0908 05/23/25 0458    134*   K 3.6 3.3*   CL 99 102   CO2 27 31   * 99   BUN 9 8   CREATININE 0.86 0.72   CALCIUM 8.4 8.4   PROT 7.5 5.6*   ALBUMIN 4.7 3.5   BILITOT 0.9 1.3*   ALKPHOS 67 42*   * 60*   * 64*       Significant Imaging: I have reviewed all pertinent imaging results/findings within the past 24 hours.      Assessment & Plan  Acute alcohol intoxication with alcoholism  Admit for ivf, detox, case management will discuss possible inpt vs. Outp programs pt states he is conisdering his options  Keppra loaded in ER, continue ativan prophylaxis  Thiamine and MVI  Received 2 x 1 L bolus in ER    Improved  On scheudled ativan 2mg every 6 hrs, will decrease to 1mg q 4 hrs, wean as tolerated    Iv ativan prn he had one dose since arrival to floor and has been stable with the po dosing overnight  No seizures    Anemia  Anemia is likely due to Iron deficiency. Most recent hemoglobin and hematocrit are listed below.  Recent Labs     05/22/25  0904 05/23/25 0458   HGB 15.0 11.6*   HCT 44.2 34.4*     Plan  - Monitor serial CBC: Daily  - Transfuse PRBC if patient becomes hemodynamically unstable, symptomatic or H/H drops below 7/21.  - Patient has not received any PRBC transfusions to date  - Patient's anemia is currently stable  -  pepcis and  protonix, high risk for gi bleed due to ETOH abuse  Hypokalemia  Patient's most recent potassium results are listed below.   Recent Labs     05/22/25  0908 05/23/25  0458   K 3.6 3.3*     Plan  - Replete potassium per protocol  - Monitor potassium Daily  - Patient's hypokalemia is improving  -    Hyponatremia  Hyponatremia is likely due to Dehydration/hypovolemia and ETOH abuse. The patient's most recent sodium results are listed below.  Recent Labs     05/22/25  0908 05/23/25  0458    134*     Plan  - Correct the sodium by 4-6mEq in 24 hours.   - Obtain the following studies:  .  - Will treat the hyponatremia with    - Monitor sodium Daily.   - Patient hyponatremia is stable  -    Thrombocytopenia  The likely etiology of thrombocytopenia is liver disease. The patients 3 most recent labs are listed below.  Recent Labs     05/22/25  0904 05/23/25  0458    147     Plan  - Will transfuse if platelet count is <50k (if undergoing surgical procedure or have active bleeding).  -      Hypomagnesemia  Patient has Abnormal Magnesium: hypomagnesemia. Will continue to monitor electrolytes closely. Will replace the affected electrolytes and repeat labs to be done after interventions completed. The patient's magnesium results have been reviewed and are listed below.  Recent Labs   Lab 05/23/25  0458   MG 1.60*      VTE Risk Mitigation (From admission, onward)           Ordered     IP VTE LOW RISK PATIENT  Once         05/22/25 1249     Place sequential compression device  Until discontinued         05/22/25 1249                    Discharge Planning   VAMSI: 5/23/2025     Code Status: Full Code   Medical Readiness for Discharge Date:   Discharge Plan A: Home                        Ira Abreu MD  Department of Hospital Medicine   [facility]/Surg

## 2025-05-23 NOTE — ASSESSMENT & PLAN NOTE
Patient's most recent potassium results are listed below.   Recent Labs     05/22/25  0908 05/23/25  0458   K 3.6 3.3*     Plan  - Replete potassium per protocol  - Monitor potassium Daily  - Patient's hypokalemia is improving  -

## 2025-05-23 NOTE — PLAN OF CARE
05/23/25 1451   Discharge Reassessment   Assessment Type Discharge Planning Reassessment   Did the patient's condition or plan change since previous assessment? No   Discharge Plan discussed with: Patient;Parent(s)   Name(s) and Number(s) Dahlia Mendieta [phone]   Communicated VAMSI with patient/caregiver Yes   Discharge Plan A Home   Discharge Plan B Other  (Substance  Abuse Rehab)   DME Needed Upon Discharge  none   Transition of Care Barriers Does not adhere to care plan;Substance Abuse   Post-Acute Status   Discharge Delays None known at this time

## 2025-05-23 NOTE — NURSING
Lory Villatoro with [facility] brought paperwork to patient.  Her phone number is listed on the paperwork and asked pt to call her tomorrow if he is d/c'd.

## 2025-05-23 NOTE — ASSESSMENT & PLAN NOTE
Hyponatremia is likely due to Dehydration/hypovolemia and ETOH abuse. The patient's most recent sodium results are listed below.  Recent Labs     05/22/25  0908 05/23/25  0458    134*     Plan  - Correct the sodium by 4-6mEq in 24 hours.   - Obtain the following studies:  .  - Will treat the hyponatremia with    - Monitor sodium Daily.   - Patient hyponatremia is stable  -

## 2025-05-23 NOTE — SUBJECTIVE & OBJECTIVE
Past Medical History:   Diagnosis Date    Depression     Seizure        Past Surgical History:   Procedure Laterality Date    ORIF FIBULA FRACTURE Left 2/17/2023    Procedure: ORIF, FRACTURE, FIBULA;  Surgeon: Keanu Villafana MD;  Location: [facility];  Service: Orthopedics;  Laterality: Left;    TONSILLECTOMY AND ADENOIDECTOMY Bilateral        Review of patient's allergies indicates:  No Known Allergies    No current facility-administered medications on file prior to encounter.     Current Outpatient Medications on File Prior to Encounter   Medication Sig    levETIRAcetam (KEPPRA) 750 MG Tab Take 750 mg by mouth once daily.    sertraline (ZOLOFT) 50 MG tablet Take 50 mg by mouth once daily.     Family History       Problem Relation (Age of Onset)    Cancer Father    Lupus Mother          Tobacco Use    Smoking status: Never     Passive exposure: Current    Smokeless tobacco: Never   Vaping Use    Vaping status: Never Used   Substance and Sexual Activity    Alcohol use: Yes     Comment: SOCIAL    Drug use: Never    Sexual activity: Yes     Review of Systems   Constitutional:  Negative for appetite change, fatigue and fever.   Respiratory:  Negative for cough, shortness of breath and wheezing.    Cardiovascular:  Negative for chest pain and leg swelling.   Gastrointestinal:  Positive for nausea and vomiting. Negative for abdominal distention, abdominal pain, constipation and diarrhea.   Skin:  Negative for color change, pallor, rash and wound.   Neurological:  Negative for tremors, syncope and headaches.   Psychiatric/Behavioral:  Negative for agitation and behavioral problems.      Objective:     Vital Signs (Most Recent):  Temp: 98.4 °F (36.9 °C) (05/23/25 1018)  Pulse: 104 (05/23/25 1244)  Resp: 18 (05/23/25 1018)  BP: 128/88 (05/23/25 1244)  SpO2: 97 % (05/23/25 1018) Vital Signs (24h Range):  Temp:  [97.9 °F (36.6 °C)-98.8 °F (37.1 °C)] 98.4 °F (36.9 °C)  Pulse:  [] 104  Resp:  [12-20] 18  SpO2:  [95 %-98  %] 97 %  BP: (114-150)/(79-98) 128/88     Weight: 99.2 kg (218 lb 12.8 oz)  Body mass index is 26.63 kg/m².     Physical Exam  Vitals and nursing note reviewed. Exam conducted with a chaperone present.   Constitutional:       General: He is not in acute distress.     Appearance: Normal appearance. He is normal weight. He is not ill-appearing.   HENT:      Head: Normocephalic and atraumatic.      Nose: Nose normal.   Eyes:      General: No scleral icterus.     Conjunctiva/sclera: Conjunctivae normal.   Cardiovascular:      Rate and Rhythm: Normal rate and regular rhythm.      Pulses: Normal pulses.      Heart sounds: Normal heart sounds.   Pulmonary:      Effort: Pulmonary effort is normal.      Breath sounds: Normal breath sounds.   Abdominal:      General: Abdomen is flat. Bowel sounds are normal.      Palpations: Abdomen is soft.   Skin:     General: Skin is warm and dry.      Findings: No erythema or rash.   Neurological:      General: No focal deficit present.      Mental Status: He is alert and oriented to person, place, and time.   Psychiatric:         Mood and Affect: Mood normal.         Behavior: Behavior normal.         Thought Content: Thought content normal.                Significant Labs: All pertinent labs within the past 24 hours have been reviewed.  CBC:   Recent Labs   Lab 05/22/25  0904 05/23/25  0458   WBC 10.55 6.93   HGB 15.0 11.6*   HCT 44.2 34.4*    147     CMP:   Recent Labs   Lab 05/22/25  0908 05/23/25  0458    134*   K 3.6 3.3*   CL 99 102   CO2 27 31   * 99   BUN 9 8   CREATININE 0.86 0.72   CALCIUM 8.4 8.4   PROT 7.5 5.6*   ALBUMIN 4.7 3.5   BILITOT 0.9 1.3*   ALKPHOS 67 42*   * 60*   * 64*       Significant Imaging: I have reviewed all pertinent imaging results/findings within the past 24 hours.

## 2025-05-23 NOTE — ASSESSMENT & PLAN NOTE
The likely etiology of thrombocytopenia is liver disease. The patients 3 most recent labs are listed below.  Recent Labs     05/22/25  0904 05/23/25  0458    147     Plan  - Will transfuse if platelet count is <50k (if undergoing surgical procedure or have active bleeding).  -

## 2025-05-24 LAB
ALBUMIN SERPL-MCNC: 3.5 G/DL (ref 3.4–5)
ALBUMIN/GLOB SERPL: 1.5 RATIO
ALP SERPL-CCNC: 45 UNIT/L (ref 50–144)
ALT SERPL-CCNC: 66 UNIT/L (ref 1–45)
ANION GAP SERPL CALC-SCNC: 1 MEQ/L (ref 2–13)
AST SERPL-CCNC: 79 UNIT/L (ref 17–59)
BASOPHILS # BLD AUTO: 0.02 X10(3)/MCL (ref 0.01–0.08)
BASOPHILS NFR BLD AUTO: 0.4 % (ref 0.1–1.2)
BILIRUB SERPL-MCNC: 1.3 MG/DL (ref 0–1)
BUN SERPL-MCNC: 3 MG/DL (ref 7–20)
CALCIUM SERPL-MCNC: 8.5 MG/DL (ref 8.4–10.2)
CHLORIDE SERPL-SCNC: 107 MMOL/L (ref 98–110)
CK SERPL-CCNC: 329 U/L (ref 55–170)
CO2 SERPL-SCNC: 28 MMOL/L (ref 21–32)
CREAT SERPL-MCNC: 0.62 MG/DL (ref 0.66–1.25)
CREAT/UREA NIT SERPL: 5 (ref 12–20)
EOSINOPHIL # BLD AUTO: 0.09 X10(3)/MCL (ref 0.04–0.54)
EOSINOPHIL NFR BLD AUTO: 2 % (ref 0.7–7)
ERYTHROCYTE [DISTWIDTH] IN BLOOD BY AUTOMATED COUNT: 13.3 %
GFR SERPLBLD CREATININE-BSD FMLA CKD-EPI: >90 ML/MIN/1.73/M2
GLOBULIN SER-MCNC: 2.3 GM/DL (ref 2–3.9)
GLUCOSE SERPL-MCNC: 85 MG/DL (ref 70–115)
HCT VFR BLD AUTO: 38 % (ref 36–52)
HGB BLD-MCNC: 12.5 G/DL (ref 13–18)
IMM GRANULOCYTES # BLD AUTO: 0 X10(3)/MCL (ref 0–0.03)
IMM GRANULOCYTES NFR BLD AUTO: 0 % (ref 0–0.5)
LYMPHOCYTES # BLD AUTO: 1.25 X10(3)/MCL (ref 1.32–3.57)
LYMPHOCYTES NFR BLD AUTO: 27.7 % (ref 20–55)
MAGNESIUM SERPL-MCNC: 2 MG/DL (ref 1.8–2.4)
MCH RBC QN AUTO: 30.9 PG (ref 27–34)
MCHC RBC AUTO-ENTMCNC: 32.9 G/DL (ref 31–37)
MCV RBC AUTO: 93.8 FL (ref 79–99)
MONOCYTES # BLD AUTO: 0.28 X10(3)/MCL (ref 0.3–0.82)
MONOCYTES NFR BLD AUTO: 6.2 % (ref 4.7–12.5)
NEUTROPHILS # BLD AUTO: 2.88 X10(3)/MCL (ref 1.78–5.38)
NEUTROPHILS NFR BLD AUTO: 63.7 % (ref 37–73)
PLATELET # BLD AUTO: 127 X10(3)/MCL (ref 140–371)
PMV BLD AUTO: 10 FL (ref 9.4–12.4)
POTASSIUM SERPL-SCNC: 3.3 MMOL/L (ref 3.5–5.1)
PROT SERPL-MCNC: 5.8 GM/DL (ref 6.3–8.2)
RBC # BLD AUTO: 4.05 X10(6)/MCL (ref 4–6)
SODIUM SERPL-SCNC: 136 MMOL/L (ref 136–145)
WBC # BLD AUTO: 4.52 X10(3)/MCL (ref 4–11.5)

## 2025-05-24 PROCEDURE — 25000003 PHARM REV CODE 250: Performed by: EMERGENCY MEDICINE

## 2025-05-24 PROCEDURE — 85025 COMPLETE CBC W/AUTO DIFF WBC: CPT | Performed by: FAMILY MEDICINE

## 2025-05-24 PROCEDURE — 82550 ASSAY OF CK (CPK): CPT | Performed by: FAMILY MEDICINE

## 2025-05-24 PROCEDURE — 80053 COMPREHEN METABOLIC PANEL: CPT | Performed by: FAMILY MEDICINE

## 2025-05-24 PROCEDURE — 25000003 PHARM REV CODE 250: Performed by: FAMILY MEDICINE

## 2025-05-24 PROCEDURE — 36415 COLL VENOUS BLD VENIPUNCTURE: CPT | Performed by: FAMILY MEDICINE

## 2025-05-24 PROCEDURE — 94761 N-INVAS EAR/PLS OXIMETRY MLT: CPT

## 2025-05-24 PROCEDURE — 21400001 HC TELEMETRY ROOM

## 2025-05-24 PROCEDURE — 83735 ASSAY OF MAGNESIUM: CPT | Performed by: FAMILY MEDICINE

## 2025-05-24 RX ADMIN — FAMOTIDINE 20 MG: 20 TABLET, FILM COATED ORAL at 08:05

## 2025-05-24 RX ADMIN — LORAZEPAM 1 MG: 1 TABLET ORAL at 11:05

## 2025-05-24 RX ADMIN — LORAZEPAM 1 MG: 1 TABLET ORAL at 04:05

## 2025-05-24 RX ADMIN — LORAZEPAM 1 MG: 1 TABLET ORAL at 08:05

## 2025-05-24 RX ADMIN — THERA TABS 1 TABLET: TAB at 08:05

## 2025-05-24 RX ADMIN — THIAMINE HCL TAB 100 MG 100 MG: 100 TAB at 08:05

## 2025-05-24 RX ADMIN — FOLIC ACID 1 MG: 1 TABLET ORAL at 08:05

## 2025-05-24 RX ADMIN — LORAZEPAM 1 MG: 1 TABLET ORAL at 03:05

## 2025-05-24 RX ADMIN — LORAZEPAM 1 MG: 1 TABLET ORAL at 12:05

## 2025-05-24 RX ADMIN — PANTOPRAZOLE SODIUM 40 MG: 40 TABLET, DELAYED RELEASE ORAL at 08:05

## 2025-05-24 RX ADMIN — SODIUM CHLORIDE: 9 INJECTION, SOLUTION INTRAVENOUS at 06:05

## 2025-05-24 RX ADMIN — ASCORBIC ACID, VITAMIN A PALMITATE, CHOLECALCIFEROL, THIAMINE HYDROCHLORIDE, RIBOFLAVIN-5 PHOSPHATE SODIUM, PYRIDOXINE HYDROCHLORIDE, NIACINAMIDE, DEXPANTHENOL, ALPHA-TOCOPHEROL ACETATE, VITAMIN K1, FOLIC ACID, BIOTIN, CYANOCOBALAMIN: 200; 3300; 200; 6; 3.6; 6; 40; 15; 10; 150; 600; 60; 5 INJECTION, SOLUTION INTRAVENOUS at 08:05

## 2025-05-24 RX ADMIN — LEVETIRACETAM 750 MG: 500 TABLET, FILM COATED ORAL at 08:05

## 2025-05-24 RX ADMIN — SERTRALINE HYDROCHLORIDE 50 MG: 50 TABLET ORAL at 08:05

## 2025-05-24 RX ADMIN — POTASSIUM CHLORIDE 40 MEQ: 1500 TABLET, EXTENDED RELEASE ORAL at 08:05

## 2025-05-24 NOTE — PROGRESS NOTES
"[facility]  Progress Note    Patient Name: Jeffrey Rankin  MRN: [id number]  Patient Class: IP- Inpatient   Admission Date: 5/22/2025  Length of Stay: 2 days  Attending Physician: No att. providers found  Primary Care Provider: Emre Lassiter MD        Subjective     Principal Problem:<principal problem not specified>        HPI:  45 yo brought in with ETOH withdrawal, h/o binge drinker last ETOH yesterday evening presented to ER with ETOH level 274, h/o seizures with prior withdrawal, c/o nausea and vomiting, was on keppra since last seizure may have missed some doses.  Pt does not know if he had a seizure     Overview/Hospital Course:  05/23/2025 pt admitted with ETOH withdrawal, possible seizure, pt does not think he had a seizure but bystanders thought he did.  Pt not very forthcoming with details. Pt mother is here with him and says she cannot "handle him" pt states he would go to rehab then told the nurse he would not go to rehab.  [facility]  Case management consulted to discuss with pt options and the substance abuse navigator will be consulted.    05/24/2025 CK improving, feels very weak today     Past Medical History:   Diagnosis Date    Depression     Seizure        Past Surgical History:   Procedure Laterality Date    ORIF FIBULA FRACTURE Left 2/17/2023    Procedure: ORIF, FRACTURE, FIBULA;  Surgeon: Keanu Villafana MD;  Location: [facility];  Service: Orthopedics;  Laterality: Left;    TONSILLECTOMY AND ADENOIDECTOMY Bilateral        Review of patient's allergies indicates:  No Known Allergies    No current facility-administered medications on file prior to encounter.     Current Outpatient Medications on File Prior to Encounter   Medication Sig    levETIRAcetam (KEPPRA) 750 MG Tab Take 750 mg by mouth once daily.    sertraline (ZOLOFT) 50 MG tablet Take 50 mg by mouth once daily.     Family History       Problem Relation (Age of " Onset)    Cancer Father    Lupus Mother          Tobacco Use    Smoking status: Never     Passive exposure: Current    Smokeless tobacco: Never   Vaping Use    Vaping status: Never Used   Substance and Sexual Activity    Alcohol use: Yes     Comment: SOCIAL    Drug use: Never    Sexual activity: Yes     Review of Systems   Constitutional:  Negative for appetite change, fatigue and fever.   Respiratory:  Negative for cough, shortness of breath and wheezing.    Cardiovascular:  Negative for chest pain and leg swelling.   Gastrointestinal:  Positive for nausea and vomiting. Negative for abdominal distention, abdominal pain, constipation and diarrhea.   Skin:  Negative for color change, pallor, rash and wound.   Neurological:  Negative for tremors, syncope and headaches.   Psychiatric/Behavioral:  Negative for agitation and behavioral problems.      Objective:     Vital Signs (Most Recent):  Temp: 97.8 °F (36.6 °C) (05/24/25 1056)  Pulse: 70 (05/24/25 1056)  Resp: 20 (05/24/25 1056)  BP: 132/85 (05/24/25 1056)  SpO2: 96 % (05/24/25 1056) Vital Signs (24h Range):  Temp:  [97.5 °F (36.4 °C)-98.3 °F (36.8 °C)] 97.8 °F (36.6 °C)  Pulse:  [] 70  Resp:  [16-20] 20  SpO2:  [95 %-98 %] 96 %  BP: (115-132)/(77-88) 132/85     Weight: 99.2 kg (218 lb 12.8 oz)  Body mass index is 26.63 kg/m².     Physical Exam  Vitals and nursing note reviewed. Exam conducted with a chaperone present.   Constitutional:       General: He is not in acute distress.     Appearance: Normal appearance. He is normal weight. He is not ill-appearing.   HENT:      Head: Normocephalic and atraumatic.      Nose: Nose normal.   Eyes:      General: No scleral icterus.     Conjunctiva/sclera: Conjunctivae normal.   Cardiovascular:      Rate and Rhythm: Normal rate and regular rhythm.      Pulses: Normal pulses.      Heart sounds: Normal heart sounds.   Pulmonary:      Effort: Pulmonary effort is normal.      Breath sounds: Normal breath sounds.   Abdominal:       General: Abdomen is flat. Bowel sounds are normal.      Palpations: Abdomen is soft.   Skin:     General: Skin is warm and dry.      Findings: No erythema or rash.   Neurological:      General: No focal deficit present.      Mental Status: He is alert and oriented to person, place, and time.   Psychiatric:         Mood and Affect: Mood normal.         Behavior: Behavior normal.         Thought Content: Thought content normal.                Significant Labs: All pertinent labs within the past 24 hours have been reviewed.  CBC:   Recent Labs   Lab 05/23/25  0458 05/24/25  0503   WBC 6.93 4.52   HGB 11.6* 12.5*   HCT 34.4* 38.0    127*     CMP:   Recent Labs   Lab 05/23/25  0458 05/24/25  0503   * 136   K 3.3* 3.3*    107   CO2 31 28   GLU 99 85   BUN 8 3*   CREATININE 0.72 0.62*   CALCIUM 8.4 8.5   PROT 5.6* 5.8*   ALBUMIN 3.5 3.5   BILITOT 1.3* 1.3*   ALKPHOS 42* 45*   AST 60* 79*   ALT 64* 66*       Significant Imaging: I have reviewed all pertinent imaging results/findings within the past 24 hours.      Assessment & Plan  Acute alcohol intoxication with alcoholism  Admit for ivf, detox, case management will discuss possible inpt vs. Outp programs pt states he is conisdering his options  Keppra loaded in ER, continue ativan prophylaxis  Thiamine and MVI  Received 2 x 1 L bolus in ER    Improved  On scheudled ativan 2mg every 6 hrs, will decrease to 1mg q 4 hrs, wean as tolerated    Iv ativan prn he had one dose since arrival to floor and has been stable with the po dosing taper today   No seizures    Anemia  Anemia is likely due to Iron deficiency. Most recent hemoglobin and hematocrit are listed below.  Recent Labs     05/22/25  0904 05/23/25  0458 05/24/25  0503   HGB 15.0 11.6* 12.5*   HCT 44.2 34.4* 38.0     Plan  - Monitor serial CBC: Daily  - Transfuse PRBC if patient becomes hemodynamically unstable, symptomatic or H/H drops below 7/21.  - Patient has not received any PRBC  transfusions to date  - Patient's anemia is currently stable  -  pepcis and protonix, high risk for gi bleed due to ETOH abuse  Hypokalemia  Patient's most recent potassium results are listed below.   Recent Labs     05/22/25  0908 05/23/25  0458 05/24/25  0503   K 3.6 3.3* 3.3*     Plan  - Replete potassium per protocol  - Monitor potassium Daily  - Patient's hypokalemia is improving  -    Hyponatremia  Hyponatremia is likely due to Dehydration/hypovolemia and ETOH abuse. The patient's most recent sodium results are listed below.  Recent Labs     05/22/25  0908 05/23/25  0458 05/24/25  0503    134* 136     Plan  - Correct the sodium by 4-6mEq in 24 hours.   - Obtain the following studies:  .  - Will treat the hyponatremia with    - Monitor sodium Daily.   - Patient hyponatremia is stable  -    Thrombocytopenia  The likely etiology of thrombocytopenia is liver disease. The patients 3 most recent labs are listed below.  Recent Labs     05/22/25  0904 05/23/25  0458 05/24/25  0503    147 127*     Plan  - Will transfuse if platelet count is <50k (if undergoing surgical procedure or have active bleeding).  -      Hypomagnesemia  Patient has Abnormal Magnesium: hypomagnesemia. Will continue to monitor electrolytes closely. Will replace the affected electrolytes and repeat labs to be done after interventions completed. The patient's magnesium results have been reviewed and are listed below.  Recent Labs   Lab 05/24/25  0503   MG 2.00      VTE Risk Mitigation (From admission, onward)           Ordered     IP VTE LOW RISK PATIENT  Once         05/22/25 1249     Place sequential compression device  Until discontinued         05/22/25 1249                    Discharge Planning   VAMSI: 5/25/2025     Code Status: Full Code   Medical Readiness for Discharge Date:   Discharge Plan A: Home   Discharge Delays: None known at this time                    Lorenzo Carter MD  Department of Hospital Medicine   [facility]

## 2025-05-24 NOTE — ASSESSMENT & PLAN NOTE
The likely etiology of thrombocytopenia is liver disease. The patients 3 most recent labs are listed below.  Recent Labs     05/22/25  0904 05/23/25  0458 05/24/25  0503    147 127*     Plan  - Will transfuse if platelet count is <50k (if undergoing surgical procedure or have active bleeding).  -

## 2025-05-24 NOTE — ASSESSMENT & PLAN NOTE
Patient has Abnormal Magnesium: hypomagnesemia. Will continue to monitor electrolytes closely. Will replace the affected electrolytes and repeat labs to be done after interventions completed. The patient's magnesium results have been reviewed and are listed below.  Recent Labs   Lab 05/24/25  0503   MG 2.00

## 2025-05-24 NOTE — ASSESSMENT & PLAN NOTE
Hyponatremia is likely due to Dehydration/hypovolemia and ETOH abuse. The patient's most recent sodium results are listed below.  Recent Labs     05/22/25  0908 05/23/25  0458 05/24/25  0503    134* 136     Plan  - Correct the sodium by 4-6mEq in 24 hours.   - Obtain the following studies:  .  - Will treat the hyponatremia with    - Monitor sodium Daily.   - Patient hyponatremia is stable  -

## 2025-05-24 NOTE — ASSESSMENT & PLAN NOTE
Admit for ivf, detox, case management will discuss possible inpt vs. Outp programs pt states he is conisdering his options  Keppra loaded in ER, continue ativan prophylaxis  Thiamine and MVI  Received 2 x 1 L bolus in ER    Improved  On scheudled ativan 2mg every 6 hrs, will decrease to 1mg q 4 hrs, wean as tolerated    Iv ativan prn he had one dose since arrival to floor and has been stable with the po dosing taper today   No seizures

## 2025-05-24 NOTE — ASSESSMENT & PLAN NOTE
Patient's most recent potassium results are listed below.   Recent Labs     05/22/25  0908 05/23/25  0458 05/24/25  0503   K 3.6 3.3* 3.3*     Plan  - Replete potassium per protocol  - Monitor potassium Daily  - Patient's hypokalemia is improving  -

## 2025-05-24 NOTE — ASSESSMENT & PLAN NOTE
Anemia is likely due to Iron deficiency. Most recent hemoglobin and hematocrit are listed below.  Recent Labs     05/22/25  0904 05/23/25  0458 05/24/25  0503   HGB 15.0 11.6* 12.5*   HCT 44.2 34.4* 38.0     Plan  - Monitor serial CBC: Daily  - Transfuse PRBC if patient becomes hemodynamically unstable, symptomatic or H/H drops below 7/21.  - Patient has not received any PRBC transfusions to date  - Patient's anemia is currently stable  -  pepcis and protonix, high risk for gi bleed due to ETOH abuse

## 2025-05-24 NOTE — SUBJECTIVE & OBJECTIVE
Past Medical History:   Diagnosis Date    Depression     Seizure        Past Surgical History:   Procedure Laterality Date    ORIF FIBULA FRACTURE Left 2/17/2023    Procedure: ORIF, FRACTURE, FIBULA;  Surgeon: Keanu Villafana MD;  Location: [facility];  Service: Orthopedics;  Laterality: Left;    TONSILLECTOMY AND ADENOIDECTOMY Bilateral        Review of patient's allergies indicates:  No Known Allergies    No current facility-administered medications on file prior to encounter.     Current Outpatient Medications on File Prior to Encounter   Medication Sig    levETIRAcetam (KEPPRA) 750 MG Tab Take 750 mg by mouth once daily.    sertraline (ZOLOFT) 50 MG tablet Take 50 mg by mouth once daily.     Family History       Problem Relation (Age of Onset)    Cancer Father    Lupus Mother          Tobacco Use    Smoking status: Never     Passive exposure: Current    Smokeless tobacco: Never   Vaping Use    Vaping status: Never Used   Substance and Sexual Activity    Alcohol use: Yes     Comment: SOCIAL    Drug use: Never    Sexual activity: Yes     Review of Systems   Constitutional:  Negative for appetite change, fatigue and fever.   Respiratory:  Negative for cough, shortness of breath and wheezing.    Cardiovascular:  Negative for chest pain and leg swelling.   Gastrointestinal:  Positive for nausea and vomiting. Negative for abdominal distention, abdominal pain, constipation and diarrhea.   Skin:  Negative for color change, pallor, rash and wound.   Neurological:  Negative for tremors, syncope and headaches.   Psychiatric/Behavioral:  Negative for agitation and behavioral problems.      Objective:     Vital Signs (Most Recent):  Temp: 97.8 °F (36.6 °C) (05/24/25 1056)  Pulse: 70 (05/24/25 1056)  Resp: 20 (05/24/25 1056)  BP: 132/85 (05/24/25 1056)  SpO2: 96 % (05/24/25 1056) Vital Signs (24h Range):  Temp:  [97.5 °F (36.4 °C)-98.3 °F (36.8 °C)] 97.8 °F (36.6 °C)  Pulse:  [] 70  Resp:  [16-20] 20  SpO2:  [95 %-98  %] 96 %  BP: (115-132)/(77-88) 132/85     Weight: 99.2 kg (218 lb 12.8 oz)  Body mass index is 26.63 kg/m².     Physical Exam  Vitals and nursing note reviewed. Exam conducted with a chaperone present.   Constitutional:       General: He is not in acute distress.     Appearance: Normal appearance. He is normal weight. He is not ill-appearing.   HENT:      Head: Normocephalic and atraumatic.      Nose: Nose normal.   Eyes:      General: No scleral icterus.     Conjunctiva/sclera: Conjunctivae normal.   Cardiovascular:      Rate and Rhythm: Normal rate and regular rhythm.      Pulses: Normal pulses.      Heart sounds: Normal heart sounds.   Pulmonary:      Effort: Pulmonary effort is normal.      Breath sounds: Normal breath sounds.   Abdominal:      General: Abdomen is flat. Bowel sounds are normal.      Palpations: Abdomen is soft.   Skin:     General: Skin is warm and dry.      Findings: No erythema or rash.   Neurological:      General: No focal deficit present.      Mental Status: He is alert and oriented to person, place, and time.   Psychiatric:         Mood and Affect: Mood normal.         Behavior: Behavior normal.         Thought Content: Thought content normal.                Significant Labs: All pertinent labs within the past 24 hours have been reviewed.  CBC:   Recent Labs   Lab 05/23/25  0458 05/24/25  0503   WBC 6.93 4.52   HGB 11.6* 12.5*   HCT 34.4* 38.0    127*     CMP:   Recent Labs   Lab 05/23/25  0458 05/24/25  0503   * 136   K 3.3* 3.3*    107   CO2 31 28   GLU 99 85   BUN 8 3*   CREATININE 0.72 0.62*   CALCIUM 8.4 8.5   PROT 5.6* 5.8*   ALBUMIN 3.5 3.5   BILITOT 1.3* 1.3*   ALKPHOS 42* 45*   AST 60* 79*   ALT 64* 66*       Significant Imaging: I have reviewed all pertinent imaging results/findings within the past 24 hours.

## 2025-05-25 VITALS
OXYGEN SATURATION: 97 % | BODY MASS INDEX: 26.65 KG/M2 | RESPIRATION RATE: 20 BRPM | HEIGHT: 76 IN | SYSTOLIC BLOOD PRESSURE: 120 MMHG | DIASTOLIC BLOOD PRESSURE: 85 MMHG | TEMPERATURE: 98 F | HEART RATE: 86 BPM | WEIGHT: 218.81 LBS

## 2025-05-25 LAB — CK SERPL-CCNC: 157 U/L (ref 55–170)

## 2025-05-25 PROCEDURE — 25000003 PHARM REV CODE 250: Performed by: FAMILY MEDICINE

## 2025-05-25 PROCEDURE — 25000003 PHARM REV CODE 250: Performed by: EMERGENCY MEDICINE

## 2025-05-25 PROCEDURE — 82550 ASSAY OF CK (CPK): CPT | Performed by: FAMILY MEDICINE

## 2025-05-25 PROCEDURE — 94761 N-INVAS EAR/PLS OXIMETRY MLT: CPT

## 2025-05-25 PROCEDURE — 36415 COLL VENOUS BLD VENIPUNCTURE: CPT | Performed by: FAMILY MEDICINE

## 2025-05-25 RX ORDER — LORAZEPAM 0.5 MG/1
0.5 TABLET ORAL EVERY 6 HOURS PRN
Qty: 15 TABLET | Refills: 0 | Status: SHIPPED | OUTPATIENT
Start: 2025-05-25 | End: 2025-06-24

## 2025-05-25 RX ADMIN — POTASSIUM CHLORIDE 40 MEQ: 1500 TABLET, EXTENDED RELEASE ORAL at 08:05

## 2025-05-25 RX ADMIN — THIAMINE HCL TAB 100 MG 100 MG: 100 TAB at 08:05

## 2025-05-25 RX ADMIN — FOLIC ACID 1 MG: 1 TABLET ORAL at 08:05

## 2025-05-25 RX ADMIN — THERA TABS 1 TABLET: TAB at 08:05

## 2025-05-25 RX ADMIN — LORAZEPAM 1 MG: 1 TABLET ORAL at 08:05

## 2025-05-25 RX ADMIN — LORAZEPAM 1 MG: 1 TABLET ORAL at 04:05

## 2025-05-25 RX ADMIN — SODIUM CHLORIDE: 9 INJECTION, SOLUTION INTRAVENOUS at 05:05

## 2025-05-25 RX ADMIN — SERTRALINE HYDROCHLORIDE 50 MG: 50 TABLET ORAL at 08:05

## 2025-05-25 RX ADMIN — LEVETIRACETAM 750 MG: 500 TABLET, FILM COATED ORAL at 09:05

## 2025-05-25 RX ADMIN — FAMOTIDINE 20 MG: 20 TABLET, FILM COATED ORAL at 08:05

## 2025-05-25 RX ADMIN — PANTOPRAZOLE SODIUM 40 MG: 40 TABLET, DELAYED RELEASE ORAL at 08:05

## 2025-05-25 NOTE — DISCHARGE SUMMARY
Hospital Medicine  Discharge Summary    Patient Name: Jeffrey Rankin  MRN: [id number]  Admit Date: 5/22/2025  Discharge Date:  5/25/2025  Status: IP- Inpatient   Length of Stay: 3      PHYSICIANS   Admitting Physician: Ira Abreu MD  Discharging Physician: Lorenzo Carter MD.  Primary Care Physician: Emre Lassiter MD        DISCHARGE DIAGNOSES   Acute alcohol intoxication with alcoholism  Admit for ivf, detox, case management will discuss possible inpt vs. Outp programs pt states he is conisdering his options  Keppra loaded in ER, continue ativan prophylaxis  Thiamine and MVI  Received 2 x 1 L bolus in ER     Improved  On scheudled ativan 2mg every 6 hrs, will decrease to 1mg q 4 hrs, wean as tolerated     Iv ativan prn he had one dose since arrival to floor and has been stable with the po dosing taper today   No seizures     Anemia  Anemia is likely due to Iron deficiency. Most recent hemoglobin and hematocrit are listed below.        Recent Labs     05/22/25  0904 05/23/25  0458 05/24/25  0503   HGB 15.0 11.6* 12.5*   HCT 44.2 34.4* 38.0      Plan  - Monitor serial CBC: Daily  - Transfuse PRBC if patient becomes hemodynamically unstable, symptomatic or H/H drops below 7/21.  - Patient has not received any PRBC transfusions to date  - Patient's anemia is currently stable  -  pepcis and protonix, high risk for gi bleed due to ETOH abuse  Hypokalemia  Patient's most recent potassium results are listed below.         Recent Labs     05/22/25  0908 05/23/25  0458 05/24/25  0503   K 3.6 3.3* 3.3*      Plan  - Replete potassium per protocol  - Monitor potassium Daily  - Patient's hypokalemia is improving  -    Hyponatremia  Hyponatremia is likely due to Dehydration/hypovolemia and ETOH abuse. The patient's most recent sodium results are listed below.        Recent Labs     05/22/25  0908 05/23/25  0458 05/24/25  0503    134* 136      Plan  - Correct the sodium by 4-6mEq in 24 hours.   - Obtain the following  "studies:  .  - Will treat the hyponatremia with    - Monitor sodium Daily.   - Patient hyponatremia is stable  -    Thrombocytopenia  The likely etiology of thrombocytopenia is liver disease. The patients 3 most recent labs are listed below.        Recent Labs     05/22/25  0904 05/23/25  0458 05/24/25  0503    147 127*      Plan  - Will transfuse if platelet count is <50k (if undergoing surgical procedure or have active bleeding).  -       Hypomagnesemia  Patient has Abnormal Magnesium: hypomagnesemia. Will continue to monitor electrolytes closely. Will replace the affected electrolytes and repeat labs to be done after interventions completed. The patient's magnesium results have been reviewed and are listed below.      PROCEDURES   * No surgery found *         HOSPITAL COURSE    45 yo brought in with ETOH withdrawal, h/o binge drinker last ETOH yesterday evening presented to ER with ETOH level 274, h/o seizures with prior withdrawal, c/o nausea and vomiting, was on keppra since last seizure may have missed some doses.  Pt does not know if he had a seizure      Overview/Hospital Course:  05/23/2025 pt admitted with ETOH withdrawal, possible seizure, pt does not think he had a seizure but bystanders thought he did.  Pt not very forthcoming with details. Pt mother is here with him and says she cannot "handle him" pt states he would go to rehab then told the nurse he would not go to rehab.  [facility]  Case management consulted to discuss with pt options and the substance abuse navigator will be consulted.    05/24/2025 CK improving, feels very weak today    Today- stable for d/c home   Will cont ativan prn  Patient to see PCP this week     STATUS  Improved, Stable    DISPOSITION  Discharge to home     DIET      ACTIVITY  As tolerated      FOLLOW-UP      Follow-up Information       Tobias Syed, NP Follow up.    Specialty: Family Medicine  Why: SOMEONE WILL CALL  YOU " WITH APPOINTMENT  Contact information:  [address] [address]  [phone]                               DISCHARGE MEDICATION RECONCILIATION        Medication List        START taking these medications      LORazepam 0.5 MG tablet  Commonly known as: ATIVAN  Take 1 tablet (0.5 mg total) by mouth every 6 (six) hours as needed (anxiety/withdrawal symptoms).            CONTINUE taking these medications      levETIRAcetam 750 MG Tab  Commonly known as: KEPPRA     sertraline 50 MG tablet  Commonly known as: ZOLOFT               Where to Get Your Medications        These medications were sent to [facility]/pharmacy #[id number] - [place], [place] - [address]  [address] [address]      Phone: [phone]   LORazepam 0.5 MG tablet           PHYSICAL EXAM   VITALS: T 97.9 °F (36.6 °C)   /85   P 86   RR 20   O2 97 %      Constitutional:       General: He is not in acute distress.     Appearance: Normal appearance. He is normal weight. He is not ill-appearing.   HENT:      Head: Normocephalic and atraumatic.      Nose: Nose normal.   Eyes:      General: No scleral icterus.     Conjunctiva/sclera: Conjunctivae normal.   Cardiovascular:      Rate and Rhythm: Normal rate and regular rhythm.      Pulses: Normal pulses.      Heart sounds: Normal heart sounds.   Pulmonary:      Effort: Pulmonary effort is normal.      Breath sounds: Normal breath sounds.   Abdominal:      General: Abdomen is flat. Bowel sounds are normal.      Palpations: Abdomen is soft.   Skin:     General: Skin is warm and dry.      Findings: No erythema or rash.   Neurological:      General: No focal deficit present.      Mental Status: He is alert and oriented to person, place, and time.   Psychiatric:         Mood and Affect: Mood normal.         Behavior: Behavior normal.         Thought Content: Thought content normal.       DIAGNOSITCS   CBC:   Recent Labs   Lab 05/22/25  0904 05/23/25  0458 05/24/25  0503   WBC 10.55 6.93 4.52   HGB  "15.0 11.6* 12.5*   HCT 44.2 34.4* 38.0    147 127*       CMP:   Recent Labs   Lab 05/22/25  0908 05/23/25  0458 05/24/25  0503   * 99 85   CALCIUM 8.4 8.4 8.5   ALBUMIN 4.7 3.5 3.5   PROT 7.5 5.6* 5.8*    134* 136   K 3.6 3.3* 3.3*   CO2 27 31 28   CL 99 102 107   BUN 9 8 3*   CREATININE 0.86 0.72 0.62*   ALKPHOS 67 42* 45*   * 64* 66*   * 60* 79*   BILITOT 0.9 1.3* 1.3*   MG  --  1.60* 2.00     Estimated Creatinine Clearance: 186.7 mL/min (A) (based on SCr of 0.62 mg/dL (L)).    Labs within the past 24 hours have been reviewed.     COAG:  Recent Labs   Lab 05/22/25  0908   APTT 23.8   INR 1.1       CARDIAC ENZYMES:   Recent Labs     05/23/25  0458 05/24/25  0503 05/25/25  0404   * 329* 157        Recent Labs   Lab 05/22/25  0908   LIPASE 57       No results for input(s): "POCTGLUCOSE" in the last 72 hours.     Microbiology Results (last 7 days)       ** No results found for the last 168 hours. **             No results for input(s): "CHOL", "TRIG", "HDL", "LDL" in the last 72 hours.   Lab Results   Component Value Date    HGBA1C 5.3 04/03/2025        No results found.    N/A     Patient Screened for food insecurity, housing instability, transportation needs, utility difficulties, and interpersonal safety.  No needs identified    Discharge time: 33 minutes         Lorenzo Carter MD  [facility]      "

## 2025-05-25 NOTE — PLAN OF CARE
Problem: Adult Inpatient Plan of Care  Goal: Plan of Care Review  Outcome: Met  Goal: Patient-Specific Goal (Individualized)  Outcome: Met  Goal: Absence of Hospital-Acquired Illness or Injury  Outcome: Met  Goal: Optimal Comfort and Wellbeing  Outcome: Met  Goal: Readiness for Transition of Care  Outcome: Met     Problem: Fall Injury Risk  Goal: Absence of Fall and Fall-Related Injury  Outcome: Met     Problem: Excessive Substance Use  Goal: Optimized Energy Level (Excessive Substance Use)  Outcome: Met  Goal: Improved Behavioral Control (Excessive Substance Use)  Outcome: Met  Goal: Increased Participation and Engagement (Excessive Substance Use)  Outcome: Met  Goal: Improved Physiologic Symptoms (Excessive Substance Use)  Outcome: Met  Goal: Enhanced Social, Occupational or Functional Skills (Excessive Substance Use)  Outcome: Met     Problem: Alcohol Withdrawal  Goal: Alcohol Withdrawal Symptom Control  Outcome: Met  Goal: Optimal Neurologic Function  Outcome: Met  Goal: Readiness for Change Identified  Outcome: Met

## 2025-05-28 ENCOUNTER — LAB VISIT (OUTPATIENT)
Dept: LAB | Facility: HOSPITAL | Age: 45
End: 2025-05-28
Attending: NURSE PRACTITIONER
Payer: MEDICAID

## 2025-05-28 DIAGNOSIS — D64.9 ANEMIA, UNSPECIFIED TYPE: ICD-10-CM

## 2025-05-28 DIAGNOSIS — E87.1 HYPOSMOLALITY SYNDROME: Primary | ICD-10-CM

## 2025-05-28 DIAGNOSIS — E87.6 HYPOPOTASSEMIA: ICD-10-CM

## 2025-05-28 DIAGNOSIS — F10.10 ALCOHOL ABUSE, CONTINUOUS: ICD-10-CM

## 2025-05-28 LAB
ALBUMIN SERPL-MCNC: 4.6 G/DL (ref 3.4–5)
ALBUMIN/GLOB SERPL: 1.9 RATIO
ALP SERPL-CCNC: 58 UNIT/L (ref 50–144)
ALT SERPL-CCNC: 136 UNIT/L (ref 1–45)
ANION GAP SERPL CALC-SCNC: 4 MEQ/L (ref 2–13)
AST SERPL-CCNC: 91 UNIT/L (ref 17–59)
BASOPHILS # BLD AUTO: 0.02 X10(3)/MCL (ref 0.01–0.08)
BASOPHILS NFR BLD AUTO: 0.3 % (ref 0.1–1.2)
BILIRUB SERPL-MCNC: 0.5 MG/DL (ref 0–1)
BILIRUBIN DIRECT+TOT PNL SERPL-MCNC: 0.2 MG/DL (ref 0–0.3)
BUN SERPL-MCNC: 17 MG/DL (ref 7–20)
CALCIUM SERPL-MCNC: 9.2 MG/DL (ref 8.4–10.2)
CHLORIDE SERPL-SCNC: 104 MMOL/L (ref 98–110)
CO2 SERPL-SCNC: 26 MMOL/L (ref 21–32)
CREAT SERPL-MCNC: 0.89 MG/DL (ref 0.66–1.25)
CREAT/UREA NIT SERPL: 19 (ref 12–20)
EOSINOPHIL # BLD AUTO: 0.1 X10(3)/MCL (ref 0.04–0.54)
EOSINOPHIL NFR BLD AUTO: 1.4 % (ref 0.7–7)
ERYTHROCYTE [DISTWIDTH] IN BLOOD BY AUTOMATED COUNT: 14.3 %
GFR SERPLBLD CREATININE-BSD FMLA CKD-EPI: >90 ML/MIN/1.73/M2
GLOBULIN SER-MCNC: 2.4 GM/DL (ref 2–3.9)
GLUCOSE SERPL-MCNC: 101 MG/DL (ref 70–115)
HCT VFR BLD AUTO: 42.3 % (ref 36–52)
HGB BLD-MCNC: 14 G/DL (ref 13–18)
IMM GRANULOCYTES # BLD AUTO: 0.04 X10(3)/MCL (ref 0–0.03)
IMM GRANULOCYTES NFR BLD AUTO: 0.6 % (ref 0–0.5)
LYMPHOCYTES # BLD AUTO: 1.52 X10(3)/MCL (ref 1.32–3.57)
LYMPHOCYTES NFR BLD AUTO: 21.3 % (ref 20–55)
MCH RBC QN AUTO: 31.4 PG (ref 27–34)
MCHC RBC AUTO-ENTMCNC: 33.1 G/DL (ref 31–37)
MCV RBC AUTO: 94.8 FL (ref 79–99)
MONOCYTES # BLD AUTO: 1.09 X10(3)/MCL (ref 0.3–0.82)
MONOCYTES NFR BLD AUTO: 15.3 % (ref 4.7–12.5)
NEUTROPHILS # BLD AUTO: 4.37 X10(3)/MCL (ref 1.78–5.38)
NEUTROPHILS NFR BLD AUTO: 61.1 % (ref 37–73)
NRBC BLD AUTO-RTO: 0 %
PLATELET # BLD AUTO: 182 X10(3)/MCL (ref 140–371)
PMV BLD AUTO: 9.7 FL (ref 9.4–12.4)
POTASSIUM SERPL-SCNC: 3.9 MMOL/L (ref 3.5–5.1)
PROT SERPL-MCNC: 7 GM/DL (ref 6.3–8.2)
RBC # BLD AUTO: 4.46 X10(6)/MCL (ref 4–6)
SODIUM SERPL-SCNC: 134 MMOL/L (ref 136–145)
WBC # BLD AUTO: 7.14 X10(3)/MCL (ref 4–11.5)

## 2025-05-28 PROCEDURE — 80076 HEPATIC FUNCTION PANEL: CPT

## 2025-05-28 PROCEDURE — 80048 BASIC METABOLIC PNL TOTAL CA: CPT

## 2025-05-28 PROCEDURE — 36415 COLL VENOUS BLD VENIPUNCTURE: CPT

## 2025-05-28 PROCEDURE — 85025 COMPLETE CBC W/AUTO DIFF WBC: CPT

## 2025-07-25 DIAGNOSIS — R74.02 NONSPECIFIC ELEVATION OF LEVELS OF TRANSAMINASE OR LACTIC ACID DEHYDROGENASE (LDH): Primary | ICD-10-CM

## 2025-07-25 DIAGNOSIS — R74.01 NONSPECIFIC ELEVATION OF LEVELS OF TRANSAMINASE OR LACTIC ACID DEHYDROGENASE (LDH): Primary | ICD-10-CM

## 2025-07-29 ENCOUNTER — HOSPITAL ENCOUNTER (OUTPATIENT)
Dept: RADIOLOGY | Facility: HOSPITAL | Age: 45
Discharge: HOME | End: 2025-07-29
Payer: MEDICAID

## 2025-07-29 DIAGNOSIS — R74.01 NONSPECIFIC ELEVATION OF LEVELS OF TRANSAMINASE OR LACTIC ACID DEHYDROGENASE (LDH): ICD-10-CM

## 2025-07-29 DIAGNOSIS — R74.02 NONSPECIFIC ELEVATION OF LEVELS OF TRANSAMINASE OR LACTIC ACID DEHYDROGENASE (LDH): ICD-10-CM

## 2025-07-29 PROCEDURE — 76700 US EXAM ABDOM COMPLETE: CPT | Mod: TC

## (undated) DEVICE — GLOVE PROTEXIS HYDROGEL SZ7.5

## (undated) DEVICE — BIT DRILL 2.5

## (undated) DEVICE — GLOVE PROTEXIS BLUE LATEX 7.5

## (undated) DEVICE — Device

## (undated) DEVICE — SUT 2/0 36IN COATED VICRYL

## (undated) DEVICE — BANDAGE COMPR DBL HOOK 4INX5YD

## (undated) DEVICE — SEE MEDLINE ITEM 157216

## (undated) DEVICE — WRAP COBAN NL STRL 4INX5YD

## (undated) DEVICE — DRAPE C-ARMOR EQUIPMENT COVER

## (undated) DEVICE — GLOVE PROTEXIS LTX 6.5

## (undated) DEVICE — GOWN POLY REINF BRTH SLV XL

## (undated) DEVICE — SPONGE DERMACEA 4X4IN 12PLY

## (undated) DEVICE — DURAPREP SURG SCRUB 26ML

## (undated) DEVICE — BANDAGE ESMARK ELASTIC ST 6X9

## (undated) DEVICE — PAD ELECTROSURGICAL SPL W/CORD

## (undated) DEVICE — CUFF TRNQT ATS 1 BLDR 42X4IN

## (undated) DEVICE — DRAPE MOBILE C-ARM

## (undated) DEVICE — DRESSING N ADH OIL EMUL 3X8

## (undated) DEVICE — BLADE SURG CARBON STEEL #10

## (undated) DEVICE — GLOVE PROTEXIS BLUE LATEX 7